# Patient Record
Sex: FEMALE | Race: WHITE
[De-identification: names, ages, dates, MRNs, and addresses within clinical notes are randomized per-mention and may not be internally consistent; named-entity substitution may affect disease eponyms.]

---

## 2017-01-16 ENCOUNTER — RX ONLY (RX ONLY)
Age: 52
End: 2017-01-16

## 2017-01-16 ENCOUNTER — DOCTOR'S OFFICE (OUTPATIENT)
Dept: URBAN - NONMETROPOLITAN AREA CLINIC 1 | Facility: CLINIC | Age: 52
Setting detail: OPHTHALMOLOGY
End: 2017-01-16
Payer: COMMERCIAL

## 2017-01-16 DIAGNOSIS — H04.122: ICD-10-CM

## 2017-01-16 DIAGNOSIS — H04.121: ICD-10-CM

## 2017-01-16 DIAGNOSIS — H40.032: ICD-10-CM

## 2017-01-16 DIAGNOSIS — H40.031: ICD-10-CM

## 2017-01-16 DIAGNOSIS — H01.005: ICD-10-CM

## 2017-01-16 DIAGNOSIS — L25.9: ICD-10-CM

## 2017-01-16 DIAGNOSIS — H01.001: ICD-10-CM

## 2017-01-16 DIAGNOSIS — H01.002: ICD-10-CM

## 2017-01-16 DIAGNOSIS — H01.004: ICD-10-CM

## 2017-01-16 PROCEDURE — 92012 INTRM OPH EXAM EST PATIENT: CPT | Performed by: OPHTHALMOLOGY

## 2017-01-16 PROCEDURE — 92020 GONIOSCOPY: CPT | Performed by: OPHTHALMOLOGY

## 2017-01-16 ASSESSMENT — VISUAL ACUITY
OD_BCVA: 20/100-1
OS_BCVA: 20/30

## 2017-01-16 ASSESSMENT — REFRACTION_CURRENTRX
OS_OVR_VA: 20/
OD_OVR_VA: 20/
OD_OVR_VA: 20/
OS_OVR_VA: 20/
OD_OVR_VA: 20/
OS_OVR_VA: 20/

## 2017-01-16 ASSESSMENT — LID EXAM ASSESSMENTS
OD_MEIBOMITIS: 1+
OS_MEIBOMITIS: 1+
OD_BLEPHARITIS: T
OD_COMMENTS: CONTACT DERMATITIS
OS_BLEPHARITIS: T
OS_COMMENTS: CONTACT DERMATITIS

## 2017-01-16 ASSESSMENT — REFRACTION_MANIFEST
OD_VA1: 20/
OS_VA2: 20/
OS_VA3: 20/
OU_VA: 20/
OS_VA2: 20/
OD_VA3: 20/
OS_VA3: 20/
OS_VA1: 20/
OD_VA3: 20/
OD_VA2: 20/
OD_VA1: 20/
OD_VA3: 20/
OS_VA1: 20/
OU_VA: 20/
OS_VA2: 20/
OS_VA1: 20/
OD_VA2: 20/
OS_VA3: 20/
OU_VA: 20/
OD_VA2: 20/
OD_VA1: 20/

## 2017-01-16 ASSESSMENT — CONFRONTATIONAL VISUAL FIELD TEST (CVF)
OD_FINDINGS: FULL
OS_FINDINGS: FULL

## 2017-10-05 ENCOUNTER — DOCTOR'S OFFICE (OUTPATIENT)
Dept: URBAN - NONMETROPOLITAN AREA CLINIC 1 | Facility: CLINIC | Age: 52
Setting detail: OPHTHALMOLOGY
End: 2017-10-05
Payer: MEDICARE

## 2017-10-05 DIAGNOSIS — H40.033: ICD-10-CM

## 2017-10-05 DIAGNOSIS — H01.005: ICD-10-CM

## 2017-10-05 DIAGNOSIS — H10.503: ICD-10-CM

## 2017-10-05 DIAGNOSIS — H01.004: ICD-10-CM

## 2017-10-05 DIAGNOSIS — H01.001: ICD-10-CM

## 2017-10-05 DIAGNOSIS — H01.002: ICD-10-CM

## 2017-10-05 PROCEDURE — 92012 INTRM OPH EXAM EST PATIENT: CPT | Performed by: OPHTHALMOLOGY

## 2017-10-05 ASSESSMENT — REFRACTION_MANIFEST
OD_VA3: 20/
OD_VA2: 20/
OS_VA1: 20/
OU_VA: 20/
OU_VA: 20/
OS_VA1: 20/
OS_VA2: 20/
OD_VA1: 20/
OD_VA1: 20/
OS_VA2: 20/
OD_VA1: 20/
OS_VA1: 20/
OS_VA3: 20/
OD_VA3: 20/
OD_VA2: 20/
OD_VA3: 20/
OD_VA2: 20/
OS_VA3: 20/
OS_VA3: 20/
OS_VA2: 20/
OU_VA: 20/

## 2017-10-05 ASSESSMENT — REFRACTION_CURRENTRX
OS_OVR_VA: 20/
OS_OVR_VA: 20/
OD_OVR_VA: 20/
OS_OVR_VA: 20/

## 2017-10-05 ASSESSMENT — CONFRONTATIONAL VISUAL FIELD TEST (CVF)
OS_FINDINGS: FULL
OD_FINDINGS: FULL

## 2017-10-05 ASSESSMENT — LID EXAM ASSESSMENTS
OS_MEIBOMITIS: 1+
OD_COMMENTS: CONTACT DERMATITIS
OD_BLEPHARITIS: T
OS_COMMENTS: CONTACT DERMATITIS
OS_BLEPHARITIS: T
OD_MEIBOMITIS: 1+

## 2017-10-05 ASSESSMENT — VISUAL ACUITY
OD_BCVA: 20/25
OS_BCVA: 20/30-2

## 2017-10-06 ENCOUNTER — DOCTOR'S OFFICE (OUTPATIENT)
Dept: URBAN - NONMETROPOLITAN AREA CLINIC 1 | Facility: CLINIC | Age: 52
Setting detail: OPHTHALMOLOGY
End: 2017-10-06
Payer: MEDICARE

## 2017-10-06 ENCOUNTER — RX ONLY (RX ONLY)
Age: 52
End: 2017-10-06

## 2017-10-06 DIAGNOSIS — H01.004: ICD-10-CM

## 2017-10-06 DIAGNOSIS — H01.002: ICD-10-CM

## 2017-10-06 DIAGNOSIS — H01.005: ICD-10-CM

## 2017-10-06 DIAGNOSIS — H01.001: ICD-10-CM

## 2017-10-06 DIAGNOSIS — H40.033: ICD-10-CM

## 2017-10-06 DIAGNOSIS — H10.503: ICD-10-CM

## 2017-10-06 PROCEDURE — 92012 INTRM OPH EXAM EST PATIENT: CPT | Performed by: OPHTHALMOLOGY

## 2017-10-06 ASSESSMENT — REFRACTION_MANIFEST
OD_VA3: 20/
OS_VA1: 20/
OU_VA: 20/
OD_VA1: 20/
OD_VA1: 20/
OS_VA3: 20/
OS_VA2: 20/
OS_VA3: 20/
OU_VA: 20/
OD_VA2: 20/
OS_VA1: 20/
OD_VA2: 20/
OS_VA2: 20/
OS_VA3: 20/
OD_VA2: 20/
OD_VA3: 20/
OS_VA2: 20/
OD_VA1: 20/
OS_VA1: 20/
OD_VA3: 20/
OU_VA: 20/

## 2017-10-06 ASSESSMENT — LID EXAM ASSESSMENTS
OS_MEIBOMITIS: 1+
OD_COMMENTS: CONTACT DERMATITIS
OD_BLEPHARITIS: T
OS_BLEPHARITIS: T
OD_MEIBOMITIS: 1+
OS_COMMENTS: CONTACT DERMATITIS

## 2017-10-06 ASSESSMENT — REFRACTION_CURRENTRX
OD_OVR_VA: 20/
OS_OVR_VA: 20/
OS_OVR_VA: 20/
OD_OVR_VA: 20/
OS_OVR_VA: 20/
OD_OVR_VA: 20/

## 2017-10-06 ASSESSMENT — VISUAL ACUITY
OS_BCVA: 20/60-1
OD_BCVA: 20/80

## 2017-10-12 ENCOUNTER — DOCTOR'S OFFICE (OUTPATIENT)
Dept: URBAN - NONMETROPOLITAN AREA CLINIC 1 | Facility: CLINIC | Age: 52
Setting detail: OPHTHALMOLOGY
End: 2017-10-12
Payer: MEDICARE

## 2017-10-12 DIAGNOSIS — H01.005: ICD-10-CM

## 2017-10-12 DIAGNOSIS — H01.001: ICD-10-CM

## 2017-10-12 DIAGNOSIS — H01.002: ICD-10-CM

## 2017-10-12 DIAGNOSIS — H01.004: ICD-10-CM

## 2017-10-12 DIAGNOSIS — H10.503: ICD-10-CM

## 2017-10-12 DIAGNOSIS — H04.123: ICD-10-CM

## 2017-10-12 PROCEDURE — 92012 INTRM OPH EXAM EST PATIENT: CPT | Performed by: OPHTHALMOLOGY

## 2017-10-12 ASSESSMENT — REFRACTION_MANIFEST
OU_VA: 20/
OD_VA2: 20/
OS_VA3: 20/
OU_VA: 20/
OD_VA3: 20/
OD_VA1: 20/
OD_VA2: 20/
OD_VA1: 20/
OS_VA3: 20/
OD_VA3: 20/
OS_VA1: 20/
OS_VA1: 20/
OS_VA2: 20/
OS_VA1: 20/
OS_VA2: 20/
OS_VA2: 20/
OS_VA3: 20/
OU_VA: 20/
OD_VA3: 20/
OD_VA1: 20/
OD_VA2: 20/

## 2017-10-12 ASSESSMENT — REFRACTION_CURRENTRX
OD_OVR_VA: 20/
OD_OVR_VA: 20/
OS_OVR_VA: 20/
OD_OVR_VA: 20/

## 2017-10-12 ASSESSMENT — LID EXAM ASSESSMENTS
OS_MEIBOMITIS: 1+
OS_COMMENTS: CONTACT DERMATITIS
OS_BLEPHARITIS: T
OD_MEIBOMITIS: 1+
OD_ANGULAR_BLEPHARITIS: 1+
OS_ANGULAR_BLEPHARITIS: 1+
OD_COMMENTS: CONTACT DERMATITIS
OD_BLEPHARITIS: T

## 2017-10-12 ASSESSMENT — SUPERFICIAL PUNCTATE KERATITIS (SPK)
OD_SPK: 2+
OS_SPK: 2+

## 2017-10-12 ASSESSMENT — VISUAL ACUITY
OD_BCVA: 20/80
OS_BCVA: 20/60-1

## 2017-10-26 ENCOUNTER — DOCTOR'S OFFICE (OUTPATIENT)
Dept: URBAN - NONMETROPOLITAN AREA CLINIC 1 | Facility: CLINIC | Age: 52
Setting detail: OPHTHALMOLOGY
End: 2017-10-26
Payer: MEDICARE

## 2017-10-26 DIAGNOSIS — H10.12: ICD-10-CM

## 2017-10-26 DIAGNOSIS — H10.503: ICD-10-CM

## 2017-10-26 DIAGNOSIS — H10.13: ICD-10-CM

## 2017-10-26 DIAGNOSIS — H01.002: ICD-10-CM

## 2017-10-26 DIAGNOSIS — H10.11: ICD-10-CM

## 2017-10-26 DIAGNOSIS — H01.001: ICD-10-CM

## 2017-10-26 DIAGNOSIS — H01.004: ICD-10-CM

## 2017-10-26 DIAGNOSIS — H04.123: ICD-10-CM

## 2017-10-26 DIAGNOSIS — H04.121: ICD-10-CM

## 2017-10-26 DIAGNOSIS — H04.122: ICD-10-CM

## 2017-10-26 DIAGNOSIS — H01.005: ICD-10-CM

## 2017-10-26 PROCEDURE — 83861 MICROFLUID ANALY TEARS: CPT | Performed by: OPHTHALMOLOGY

## 2017-10-26 PROCEDURE — 92014 COMPRE OPH EXAM EST PT 1/>: CPT | Performed by: OPHTHALMOLOGY

## 2017-10-26 ASSESSMENT — REFRACTION_MANIFEST
OU_VA: 20/
OS_VA2: 20/
OD_VA1: 20/
OS_VA3: 20/
OD_VA2: 20/
OS_VA1: 20/
OD_VA1: 20/
OS_VA3: 20/
OD_VA3: 20/
OS_VA2: 20/
OD_VA3: 20/
OS_VA3: 20/
OU_VA: 20/
OS_VA1: 20/
OD_VA3: 20/
OU_VA: 20/
OD_VA2: 20/
OS_VA1: 20/
OD_VA2: 20/
OS_VA2: 20/
OD_VA1: 20/

## 2017-10-26 ASSESSMENT — LID EXAM ASSESSMENTS
OD_ANGULAR_BLEPHARITIS: T
OS_ANGULAR_BLEPHARITIS: T
OD_BLEPHARITIS: T
OS_MEIBOMITIS: T
OS_BLEPHARITIS: T
OD_COMMENTS: CONTACT DERMATITIS
OS_COMMENTS: CONTACT DERMATITIS
OD_MEIBOMITIS: T

## 2017-10-26 ASSESSMENT — VISUAL ACUITY
OD_BCVA: 20/80
OS_BCVA: 20/60-1

## 2017-10-26 ASSESSMENT — REFRACTION_CURRENTRX
OS_OVR_VA: 20/
OD_OVR_VA: 20/

## 2017-10-26 ASSESSMENT — SUPERFICIAL PUNCTATE KERATITIS (SPK)
OS_SPK: 2+
OD_SPK: 2+

## 2017-10-26 ASSESSMENT — CONFRONTATIONAL VISUAL FIELD TEST (CVF)
OS_FINDINGS: FULL
OD_FINDINGS: FULL

## 2017-12-01 ENCOUNTER — RX ONLY (RX ONLY)
Age: 52
End: 2017-12-01

## 2017-12-01 ENCOUNTER — DOCTOR'S OFFICE (OUTPATIENT)
Dept: URBAN - NONMETROPOLITAN AREA CLINIC 1 | Facility: CLINIC | Age: 52
Setting detail: OPHTHALMOLOGY
End: 2017-12-01
Payer: MEDICARE

## 2017-12-01 DIAGNOSIS — H10.13: ICD-10-CM

## 2017-12-01 DIAGNOSIS — H10.503: ICD-10-CM

## 2017-12-01 DIAGNOSIS — H01.001: ICD-10-CM

## 2017-12-01 DIAGNOSIS — H04.123: ICD-10-CM

## 2017-12-01 DIAGNOSIS — H01.004: ICD-10-CM

## 2017-12-01 DIAGNOSIS — H01.005: ICD-10-CM

## 2017-12-01 DIAGNOSIS — H01.002: ICD-10-CM

## 2017-12-01 PROCEDURE — 92014 COMPRE OPH EXAM EST PT 1/>: CPT | Performed by: OPHTHALMOLOGY

## 2017-12-01 ASSESSMENT — REFRACTION_CURRENTRX
OD_OVR_VA: 20/
OS_OVR_VA: 20/

## 2017-12-01 ASSESSMENT — REFRACTION_MANIFEST
OD_VA1: 20/
OS_VA1: 20/
OS_VA3: 20/
OD_VA1: 20/
OD_VA2: 20/
OS_VA3: 20/
OS_VA2: 20/
OD_VA3: 20/
OD_VA2: 20/
OS_VA2: 20/
OS_VA2: 20/
OU_VA: 20/
OD_VA3: 20/
OU_VA: 20/
OS_VA1: 20/
OD_VA2: 20/
OU_VA: 20/
OD_VA1: 20/
OS_VA1: 20/
OD_VA3: 20/
OS_VA3: 20/

## 2017-12-01 ASSESSMENT — CONFRONTATIONAL VISUAL FIELD TEST (CVF)
OS_FINDINGS: FULL
OD_FINDINGS: FULL

## 2017-12-01 ASSESSMENT — LID EXAM ASSESSMENTS
OS_BLEPHARITIS: T
OD_MEIBOMITIS: T
OS_COMMENTS: CONTACT DERMATITIS
OS_ANGULAR_BLEPHARITIS: T
OS_MEIBOMITIS: T
OD_ANGULAR_BLEPHARITIS: T
OD_BLEPHARITIS: T
OD_COMMENTS: CONTACT DERMATITIS

## 2017-12-01 ASSESSMENT — SUPERFICIAL PUNCTATE KERATITIS (SPK)
OD_SPK: 2+
OS_SPK: 2+

## 2017-12-01 ASSESSMENT — VISUAL ACUITY
OD_BCVA: 20/80
OS_BCVA: 20/50-2

## 2019-02-11 ENCOUNTER — DOCTOR'S OFFICE (OUTPATIENT)
Dept: URBAN - NONMETROPOLITAN AREA CLINIC 1 | Facility: CLINIC | Age: 54
Setting detail: OPHTHALMOLOGY
End: 2019-02-11
Payer: COMMERCIAL

## 2019-02-11 ENCOUNTER — RX ONLY (RX ONLY)
Age: 54
End: 2019-02-11

## 2019-02-11 DIAGNOSIS — H40.033: ICD-10-CM

## 2019-02-11 DIAGNOSIS — H10.503: ICD-10-CM

## 2019-02-11 DIAGNOSIS — H53.123: ICD-10-CM

## 2019-02-11 DIAGNOSIS — H04.123: ICD-10-CM

## 2019-02-11 PROCEDURE — 92020 GONIOSCOPY: CPT | Performed by: OPHTHALMOLOGY

## 2019-02-11 PROCEDURE — 92132 CPTRZD OPH DX IMG ANT SGM: CPT | Performed by: OPHTHALMOLOGY

## 2019-02-11 PROCEDURE — 92012 INTRM OPH EXAM EST PATIENT: CPT | Performed by: OPHTHALMOLOGY

## 2019-02-11 ASSESSMENT — REFRACTION_MANIFEST
OS_VA2: 20/
OS_VA3: 20/
OD_VA1: 20/
OD_VA2: 20/
OD_VA3: 20/
OS_VA1: 20/
OS_VA3: 20/
OD_VA1: 20/
OS_VA1: 20/
OU_VA: 20/
OS_VA2: 20/
OD_VA3: 20/
OD_VA2: 20/
OU_VA: 20/

## 2019-02-11 ASSESSMENT — REFRACTION_CURRENTRX
OD_OVR_VA: 20/
OS_OVR_VA: 20/
OD_OVR_VA: 20/
OD_OVR_VA: 20/

## 2019-02-11 ASSESSMENT — CONFRONTATIONAL VISUAL FIELD TEST (CVF)
OS_FINDINGS: FULL
OD_FINDINGS: FULL

## 2019-02-11 ASSESSMENT — VISUAL ACUITY
OD_BCVA: 20/60+1
OS_BCVA: 20/40

## 2019-02-11 ASSESSMENT — LID EXAM ASSESSMENTS
OS_MEIBOMITIS: T
OS_COMMENTS: CONTACT DERMATITIS
OD_MEIBOMITIS: T
OS_BLEPHARITIS: T
OD_BLEPHARITIS: T
OD_COMMENTS: CONTACT DERMATITIS
OS_ANGULAR_BLEPHARITIS: T
OD_ANGULAR_BLEPHARITIS: T

## 2019-02-11 ASSESSMENT — SUPERFICIAL PUNCTATE KERATITIS (SPK)
OD_SPK: 2+
OS_SPK: 2+

## 2019-03-15 ENCOUNTER — DOCTOR'S OFFICE (OUTPATIENT)
Dept: URBAN - NONMETROPOLITAN AREA CLINIC 1 | Facility: CLINIC | Age: 54
Setting detail: OPHTHALMOLOGY
End: 2019-03-15
Payer: COMMERCIAL

## 2019-03-15 DIAGNOSIS — H53.123: ICD-10-CM

## 2019-03-15 DIAGNOSIS — H04.123: ICD-10-CM

## 2019-03-15 DIAGNOSIS — H53.122: ICD-10-CM

## 2019-03-15 DIAGNOSIS — H10.11: ICD-10-CM

## 2019-03-15 DIAGNOSIS — H04.122: ICD-10-CM

## 2019-03-15 DIAGNOSIS — H40.033: ICD-10-CM

## 2019-03-15 DIAGNOSIS — H53.121: ICD-10-CM

## 2019-03-15 PROCEDURE — 83861 MICROFLUID ANALY TEARS: CPT | Performed by: OPHTHALMOLOGY

## 2019-03-15 PROCEDURE — 92235 FLUORESCEIN ANGRPH MLTIFRAME: CPT | Performed by: OPHTHALMOLOGY

## 2019-03-15 PROCEDURE — 92225 OPHTHALMOSCOPY EXTENDED INITIAL: CPT | Performed by: OPHTHALMOLOGY

## 2019-03-15 PROCEDURE — 92250 FUNDUS PHOTOGRAPHY W/I&R: CPT | Performed by: OPHTHALMOLOGY

## 2019-03-15 PROCEDURE — 92014 COMPRE OPH EXAM EST PT 1/>: CPT | Performed by: OPHTHALMOLOGY

## 2019-03-15 ASSESSMENT — REFRACTION_MANIFEST
OS_VA2: 20/
OU_VA: 20/
OD_VA2: 20/
OS_VA1: 20/
OD_VA3: 20/
OS_VA3: 20/
OS_VA2: 20/
OD_VA2: 20/
OD_VA3: 20/
OD_VA1: 20/
OU_VA: 20/
OD_VA1: 20/
OS_VA1: 20/
OS_VA3: 20/

## 2019-03-15 ASSESSMENT — LID EXAM ASSESSMENTS
OS_BLEPHARITIS: T
OS_MEIBOMITIS: T
OD_COMMENTS: CONTACT DERMATITIS
OD_MEIBOMITIS: T
OS_ANGULAR_BLEPHARITIS: T
OD_BLEPHARITIS: T
OS_COMMENTS: CONTACT DERMATITIS
OD_ANGULAR_BLEPHARITIS: T

## 2019-03-15 ASSESSMENT — REFRACTION_CURRENTRX
OS_OVR_VA: 20/
OD_OVR_VA: 20/
OD_OVR_VA: 20/
OS_OVR_VA: 20/
OS_OVR_VA: 20/
OD_OVR_VA: 20/

## 2019-03-15 ASSESSMENT — VISUAL ACUITY
OD_BCVA: 20/70+2
OS_BCVA: 20/30-1

## 2019-03-15 ASSESSMENT — CONFRONTATIONAL VISUAL FIELD TEST (CVF)
OS_FINDINGS: FULL
OD_FINDINGS: FULL

## 2019-03-15 ASSESSMENT — SUPERFICIAL PUNCTATE KERATITIS (SPK)
OS_SPK: 2+
OD_SPK: 2+

## 2019-03-15 ASSESSMENT — DECREASING TEAR LAKE - SEVERITY SCORE
OD_DEC_TEARLAKE: 1+
OS_DEC_TEARLAKE: 1+

## 2019-04-01 ENCOUNTER — DOCTOR'S OFFICE (OUTPATIENT)
Dept: URBAN - NONMETROPOLITAN AREA CLINIC 1 | Facility: CLINIC | Age: 54
Setting detail: OPHTHALMOLOGY
End: 2019-04-01
Payer: COMMERCIAL

## 2019-04-01 DIAGNOSIS — H52.223: ICD-10-CM

## 2019-04-01 DIAGNOSIS — H52.4: ICD-10-CM

## 2019-04-01 PROBLEM — H25.13 CATARACT NUCLEAR SCLEROSIS AGE RELATED; BOTH EYES: Status: ACTIVE | Noted: 2017-01-16

## 2019-04-01 PROBLEM — H40.032: Status: ACTIVE | Noted: 2017-01-16

## 2019-04-01 PROBLEM — H01.004 BLEPHARITIS; RIGHT UPPER LID, RIGHT LOWER LID, LEFT UPPER LID, LEFT LOWER LID: Status: ACTIVE | Noted: 2017-10-26

## 2019-04-01 PROBLEM — H10.503: Status: ACTIVE | Noted: 2017-10-05

## 2019-04-01 PROBLEM — H53.122: Status: ACTIVE | Noted: 2019-02-11

## 2019-04-01 PROBLEM — H01.001 BLEPHARITIS; RIGHT UPPER LID, RIGHT LOWER LID, LEFT UPPER LID, LEFT LOWER LID: Status: ACTIVE | Noted: 2017-10-26

## 2019-04-01 PROBLEM — H01.002 BLEPHARITIS; RIGHT UPPER LID, RIGHT LOWER LID, LEFT UPPER LID, LEFT LOWER LID: Status: ACTIVE | Noted: 2017-10-26

## 2019-04-01 PROBLEM — H40.031: Status: ACTIVE | Noted: 2017-01-16

## 2019-04-01 PROBLEM — H01.005 BLEPHARITIS; RIGHT UPPER LID, RIGHT LOWER LID, LEFT UPPER LID, LEFT LOWER LID: Status: ACTIVE | Noted: 2017-10-26

## 2019-04-01 PROBLEM — H10.12 ALLERGIC CONJUNCTIVITIS; RIGHT EYE, LEFT EYE: Status: ACTIVE | Noted: 2017-10-26

## 2019-04-01 PROBLEM — H04.121 DRY EYE; RIGHT EYE, LEFT EYE: Status: ACTIVE | Noted: 2017-01-16

## 2019-04-01 PROBLEM — L25.9 CONTACT DERMATITIS, UNSPECIFIED CAUSE ; BOTH EYES: Status: ACTIVE | Noted: 2017-01-16

## 2019-04-01 PROBLEM — H10.11 ALLERGIC CONJUNCTIVITIS; RIGHT EYE, LEFT EYE: Status: ACTIVE | Noted: 2017-10-26

## 2019-04-01 PROBLEM — H04.122 DRY EYE; RIGHT EYE, LEFT EYE: Status: ACTIVE | Noted: 2017-01-16

## 2019-04-01 PROBLEM — H53.121: Status: ACTIVE | Noted: 2019-02-11

## 2019-04-01 PROCEDURE — 92015 DETERMINE REFRACTIVE STATE: CPT | Performed by: OPTOMETRIST

## 2019-04-01 ASSESSMENT — REFRACTION_MANIFEST
OD_AXIS: 090
OD_SPHERE: PLANO
OD_VA3: 20/
OU_VA: 20/
OS_VA3: 20/
OD_VA3: 20/
OU_VA: 20/
OD_VA2: 20/
OS_VA1: 20/
OS_VA2: 20/20-2
OD_VA1: 20/
OS_AXIS: 085
OD_ADD: +2.50
OD_CYLINDER: -1.00
OD_VA1: 20/20-1
OS_SPHERE: -1.25
OS_VA1: 20/20-2
OS_ADD: +2.50
OS_VA2: 20/
OS_VA3: 20/
OD_VA2: 20/20-1
OS_CYLINDER: -1.25

## 2019-04-01 ASSESSMENT — REFRACTION_AUTOREFRACTION
OD_CYLINDER: -0.75
OS_AXIS: 086
OD_AXIS: 089
OD_SPHERE: -0.25
OS_SPHERE: -1.25
OS_CYLINDER: -1.25

## 2019-04-01 ASSESSMENT — REFRACTION_CURRENTRX
OS_OVR_VA: 20/
OD_OVR_VA: 20/

## 2019-04-01 ASSESSMENT — SPHEQUIV_DERIVED
OS_SPHEQUIV: -1.875
OS_SPHEQUIV: -1.875
OD_SPHEQUIV: -0.625

## 2019-04-01 ASSESSMENT — VISUAL ACUITY
OD_BCVA: 20/70+2
OS_BCVA: 20/30-1

## 2019-04-23 ENCOUNTER — OPTICAL OFFICE (OUTPATIENT)
Dept: URBAN - NONMETROPOLITAN AREA CLINIC 4 | Facility: CLINIC | Age: 54
Setting detail: OPHTHALMOLOGY
End: 2019-04-23
Payer: COMMERCIAL

## 2019-04-23 DIAGNOSIS — H52.223: ICD-10-CM

## 2019-04-23 PROCEDURE — V2781 PROGRESSIVE LENS PER LENS: HCPCS | Performed by: OPTOMETRIST

## 2019-04-23 PROCEDURE — V2025 EYEGLASSES DELUX FRAMES: HCPCS | Performed by: OPTOMETRIST

## 2019-04-23 PROCEDURE — V2020 VISION SVCS FRAMES PURCHASES: HCPCS | Performed by: OPTOMETRIST

## 2019-04-23 PROCEDURE — V2202 LENS SPHERE BIFOCAL 7.12-20.: HCPCS | Performed by: OPTOMETRIST

## 2019-04-23 PROCEDURE — V2750 ANTI-REFLECTIVE COATING: HCPCS | Performed by: OPTOMETRIST

## 2020-02-06 ENCOUNTER — APPOINTMENT (EMERGENCY)
Dept: RADIOLOGY | Facility: HOSPITAL | Age: 55
End: 2020-02-06
Payer: COMMERCIAL

## 2020-02-06 ENCOUNTER — HOSPITAL ENCOUNTER (EMERGENCY)
Facility: HOSPITAL | Age: 55
Discharge: HOME | End: 2020-02-06
Attending: EMERGENCY MEDICINE | Admitting: EMERGENCY MEDICINE
Payer: COMMERCIAL

## 2020-02-06 VITALS
HEIGHT: 68 IN | TEMPERATURE: 97.3 F | SYSTOLIC BLOOD PRESSURE: 119 MMHG | BODY MASS INDEX: 25.01 KG/M2 | DIASTOLIC BLOOD PRESSURE: 75 MMHG | OXYGEN SATURATION: 97 % | RESPIRATION RATE: 17 BRPM | HEART RATE: 78 BPM | WEIGHT: 165 LBS

## 2020-02-06 DIAGNOSIS — S46.002A ROTATOR CUFF INJURY, LEFT, INITIAL ENCOUNTER: Primary | ICD-10-CM

## 2020-02-06 PROCEDURE — 99283 EMERGENCY DEPT VISIT LOW MDM: CPT

## 2020-02-06 PROCEDURE — 73030 X-RAY EXAM OF SHOULDER: CPT

## 2020-02-06 PROCEDURE — 99284 EMERGENCY DEPT VISIT MOD MDM: CPT | Performed by: EMERGENCY MEDICINE

## 2020-02-06 RX ORDER — OXYCODONE HYDROCHLORIDE AND ACETAMINOPHEN 5; 325 MG/1; MG/1
1 TABLET ORAL EVERY 4 HOURS PRN
Qty: 10 TABLET | Refills: 0 | Status: SHIPPED | OUTPATIENT
Start: 2020-02-06 | End: 2021-07-26

## 2020-02-06 RX ORDER — NAPROXEN 500 MG/1
500 TABLET ORAL 2 TIMES DAILY WITH MEALS
Qty: 30 TABLET | Refills: 0 | Status: SHIPPED | OUTPATIENT
Start: 2020-02-06 | End: 2021-08-06 | Stop reason: SDUPTHER

## 2020-02-06 RX ORDER — CITALOPRAM 20 MG/1
20 TABLET ORAL DAILY
COMMUNITY
Start: 2019-09-09

## 2020-02-06 NOTE — ED PROVIDER NOTES
History  Chief Complaint   Patient presents with    Shoulder Injury     Pt reports approx 1 week ago she fell outside of a [employer] on a curb onto her L shoulder     Approximately 12 days ago the patient tripped over curb and hurt her left shoulder  Has been having increasing pain since then  No history of previous injury  Try to push off the shoulder last night and the pain increased  Worse with movement  No chest pain shortness breath  No abdominal pain  No neck pain  No loss conscious  History provided by:  Patient   used: No    Shoulder Injury   Location:  Shoulder  Shoulder location:  L shoulder  Pain details:     Quality:  Aching    Radiates to:  Does not radiate    Severity:  Moderate    Onset quality:  Gradual    Duration:  12 days    Timing:  Constant    Progression:  Worsening  Foreign body present:  No foreign bodies  Prior injury to area:  No  Relieved by:  Nothing  Worsened by: Movement  Ineffective treatments:  None tried  Associated symptoms: no back pain, no fatigue, no fever, no neck pain, no swelling and no tingling        Prior to Admission Medications   Prescriptions Last Dose Informant Patient Reported? Taking?   citalopram (CeleXA) 20 mg tablet   Yes Yes   Sig: Take 20 mg by mouth daily      Facility-Administered Medications: None       History reviewed  No pertinent past medical history  History reviewed  No pertinent surgical history  History reviewed  No pertinent family history  I have reviewed and agree with the history as documented  Social History     Tobacco Use    Smoking status: Current Every Day Smoker     Packs/day: 0 50    Smokeless tobacco: Never Used   Substance Use Topics    Alcohol use: Not Currently    Drug use: Not Currently        Review of Systems   Constitutional: Negative for chills, fatigue and fever  HENT: Negative for ear pain, hearing loss, sore throat, trouble swallowing and voice change      Eyes: Negative for pain and discharge  Respiratory: Negative for cough, shortness of breath and wheezing  Cardiovascular: Negative for chest pain and palpitations  Gastrointestinal: Negative for abdominal pain, blood in stool, constipation, diarrhea, nausea and vomiting  Genitourinary: Negative for dysuria, flank pain, frequency and hematuria  Musculoskeletal: Positive for arthralgias  Negative for back pain, joint swelling, neck pain and neck stiffness  Skin: Negative for rash and wound  Neurological: Negative for dizziness, seizures, syncope, facial asymmetry and headaches  Psychiatric/Behavioral: Negative for hallucinations, self-injury and suicidal ideas  All other systems reviewed and are negative  Physical Exam  Physical Exam   Constitutional: She is oriented to person, place, and time  She appears well-developed and well-nourished  HENT:   Head: Normocephalic and atraumatic  Eyes: Pupils are equal, round, and reactive to light  EOM are normal    Neck: Normal range of motion  Neck supple  Cardiovascular: Normal rate and regular rhythm  Pulmonary/Chest: Effort normal and breath sounds normal  No respiratory distress  Abdominal: Soft  Bowel sounds are normal  There is no tenderness  Musculoskeletal:   Decreased range of motion at the shoulder  AC joint with minimal tenderness  Radial pulses is 2+  Cap refill less than 2 seconds  Neurological: She is alert and oriented to person, place, and time  Skin: Skin is warm and dry  Capillary refill takes less than 2 seconds  Psychiatric: She has a normal mood and affect         Vital Signs  ED Triage Vitals [02/06/20 1124]   Temperature Pulse Respirations Blood Pressure SpO2   (!) 97 3 °F (36 3 °C) 77 18 120/85 98 %      Temp Source Heart Rate Source Patient Position - Orthostatic VS BP Location FiO2 (%)   Temporal Monitor Lying Right arm --      Pain Score       7           Vitals:    02/06/20 1124   BP: 120/85   Pulse: 77   Patient Position - Orthostatic VS: Lying         Visual Acuity      ED Medications  Medications - No data to display    Diagnostic Studies  Results Reviewed     None                 XR shoulder 2+ views LEFT    (Results Pending)              Procedures  Procedures         ED Course  ED Course as of Feb 06 1151   Thu Feb 06, 2020   1146 Discussed with patient her x-ray results  Will place in a sling  Patient is made aware that she cannot keep the arm in the sling for prolonged period period need to do range-of-motion exercises with the shoulder  MDM      Disposition  Final diagnoses:   Rotator cuff injury, left, initial encounter     Time reflects when diagnosis was documented in both MDM as applicable and the Disposition within this note     Time User Action Codes Description Comment    2/6/2020 11:47 AM Burt Galdamez Add [S46 002A] Rotator cuff injury, left, initial encounter       ED Disposition     ED Disposition Condition Date/Time Comment    Discharge Stable u Feb 6, 2020 11:46 AM Lilian Barkley discharge to home/self care              Follow-up Information     Follow up With Specialties Details Why Lorayne Sandhoff, MD Internal Medicine In 2 days  [address] [facility] [address] [phone]            Patient's Medications   Discharge Prescriptions    NAPROXEN (NAPROSYN) 500 MG TABLET    Take 1 tablet (500 mg total) by mouth 2 (two) times a day with meals       Start Date: 2/6/2020  End Date: --       Order Dose: 500 mg       Quantity: 30 tablet    Refills: 0    OXYCODONE-ACETAMINOPHEN (PERCOCET) 5-325 MG PER TABLET    Take 1 tablet by mouth every 4 (four) hours as needed for moderate pain for up to 10 dosesMax Daily Amount: 6 tablets       Start Date: 2/6/2020  End Date: --       Order Dose: 1 tablet       Quantity: 10 tablet    Refills: 0         ED Provider  Electronically Signed by           Bryson Youngblood MD  02/06/20 1131 No  Muir Schmidt [place] Abhilash Chi, MD  02/06/20 115

## 2020-02-06 NOTE — DISCHARGE INSTRUCTIONS
A few times each day please take your shoulder out of the sling and do range-of-motion exercises  Do not keep her shoulder in the sling for prolonged periods of time

## 2020-02-10 ENCOUNTER — OFFICE VISIT (OUTPATIENT)
Dept: OBGYN CLINIC | Facility: CLINIC | Age: 55
End: 2020-02-10
Payer: COMMERCIAL

## 2020-02-10 VITALS
DIASTOLIC BLOOD PRESSURE: 80 MMHG | RESPIRATION RATE: 16 BRPM | HEIGHT: 68 IN | WEIGHT: 168.87 LBS | HEART RATE: 68 BPM | SYSTOLIC BLOOD PRESSURE: 116 MMHG | BODY MASS INDEX: 25.59 KG/M2

## 2020-02-10 DIAGNOSIS — M25.512 ACUTE PAIN OF LEFT SHOULDER: Primary | ICD-10-CM

## 2020-02-10 DIAGNOSIS — M75.22 BICIPITAL TENDINITIS OF LEFT SHOULDER: ICD-10-CM

## 2020-02-10 PROCEDURE — 99204 OFFICE O/P NEW MOD 45 MIN: CPT | Performed by: ORTHOPAEDIC SURGERY

## 2020-02-10 NOTE — PROGRESS NOTES
ASSESSMENT/PLAN:    Diagnoses and all orders for this visit:    Acute pain of left shoulder    Bicipital tendinitis of left shoulder  -     Ambulatory referral to Orthopedic Surgery        Plan:  I offered the potential benefits of injection but she does not want to consider injection at this time  A prescription for physical therapy was placed in her chart and she was to initiate physical therapy as soon as possible  She is to begin working on gentle range-of-motion exercises as discussed in the office today and use the sling only as needed  She will continue the Naprosyn  She was instructed to contact me if she needs a refill  I will see her in 3 or 4 weeks for re-evaluation and discuss further treatment options pending her progress  Return for 3 - 4 weeks  _____________________________________________________  CHIEF COMPLAINT:  Chief Complaint   Patient presents with    Left Shoulder - Pain         SUBJECTIVE:  Emeli Dior is a 63y o  year old right-hand-dominant female who presents for evaluation of her left shoulder injured on 01/24/2020  She states she fell outside of store but cannot recall the exact mechanism of injury  She noted a mild degree of improvement over the ensuing 2 and half weeks but developed an acute worsening of her pain on 02/06/2020  She states she pushed herself up out of bed, felt a pop and had significant worsening of pain  Although pain has improved to some degree, it has not returned to the level of pain she had been experiencing on 02/05/2020  She was seen in the emergency room at [address] [place] [address] on 02/06/2020, x-rays were obtained and she presents for orthopedic evaluation and treatment  She complains of pain over the anterior shoulder radiating to the mid arm  She experiences paresthesias in the left upper extremity since the initial fall although these do seem to be improving  She denies any neck pain  She denies any right shoulder or upper extremity complaints  PAST MEDICAL HISTORY:  Past Medical History:   Diagnosis Date    Anxiety     Depression     Lumbar herniated disc        PAST SURGICAL HISTORY:  Past Surgical History:   Procedure Laterality Date    HERNIA REPAIR      HYSTERECTOMY      KNEE SURGERY         FAMILY HISTORY:  Family History   Problem Relation Age of Onset    Stroke Mother     Hypertension Mother     Cancer Father     Hypertension Father     No Known Problems Sister     Diabetes Maternal Grandfather        SOCIAL HISTORY:  Social History     Tobacco Use    Smoking status: Current Every Day Smoker     Packs/day: 0 50    Smokeless tobacco: Never Used   Substance Use Topics    Alcohol use: Not Currently    Drug use: Not Currently       MEDICATIONS:    Current Outpatient Medications:     citalopram (CeleXA) 20 mg tablet, Take 20 mg by mouth daily, Disp: , Rfl:     naproxen (NAPROSYN) 500 mg tablet, Take 1 tablet (500 mg total) by mouth 2 (two) times a day with meals, Disp: 30 tablet, Rfl: 0    oxyCODONE-acetaminophen (PERCOCET) 5-325 mg per tablet, Take 1 tablet by mouth every 4 (four) hours as needed for moderate pain for up to 10 dosesMax Daily Amount: 6 tablets (Patient not taking: Reported on 2/10/2020), Disp: 10 tablet, Rfl: 0    ALLERGIES:  Allergies   Allergen Reactions    Penicillins        Review of systems:   Constitutional: Negative for fatigue, fever or loss of apetite  HENT: Negative  Respiratory: Negative for shortness of breath, dyspnea  Cardiovascular: Negative for chest pain/tightness  Gastrointestinal: Negative for abdominal pain, N/V  Endocrine: Negative for cold/heat intolerance, unexplained weight loss/gain  Genitourinary: Negative for flank pain, dysuria, hematuria  Musculoskeletal:  Positive as in the HPI   Skin: Negative for rash  Neurological:  Positive as in the HPI  Psychiatric/Behavioral: Negative for agitation    _____________________________________________________  PHYSICAL EXAMINATION:    Blood pressure 116/80, pulse 68, resp  rate 16, height 5' 8" (1 727 m), weight 76 6 kg (168 lb 14 oz)  General: well developed and well nourished, alert, oriented times 3 and appears comfortable  Psychiatric: Normal  HEENT: Benign  Cardiovascular: Regular    Pulmonary: No wheezing or stridor  Abdomen: Soft, Nontender  Skin: No masses, erythema, lacerations, fluctation, ulcerations  Neurovascular: Motor and sensory exams are grossly intact  Pulses are palpable  MUSCULOSKELETAL EXAMINATION:    The left shoulder exam demonstrates abduction to 130°, comparable to the contralateral right side but with pain during left shoulder range of motion  She has 120° of forward flexion complaining of pain greater than with abduction  Abduction/ER is to 80°, abduction/IR to 70° with pain  She has tenderness over the biceps tendon, the rotator cuff is nontender  Catalina's, Hall's, Whaley, Neer's, bear hug and horn blower tests are all painful but no weakness is noted  The remainder of the upper extremity examination bilaterally is benign  The cervical spine demonstrates good range of motion without complaints  DTRs bilateral upper extremities 2/4       _____________________________________________________  STUDIES REVIEWED:  X-rays of her shoulder from the emergency room visit demonstrated mild degenerative change at the acromioclavicular joint without acute abnormalities noted otherwise  The report was reviewed          Alek New

## 2021-03-10 DIAGNOSIS — Z23 ENCOUNTER FOR IMMUNIZATION: ICD-10-CM

## 2021-03-19 ENCOUNTER — IMMUNIZATIONS (OUTPATIENT)
Dept: FAMILY MEDICINE CLINIC | Facility: HOSPITAL | Age: 56
End: 2021-03-19

## 2021-03-19 DIAGNOSIS — Z23 ENCOUNTER FOR IMMUNIZATION: Primary | ICD-10-CM

## 2021-03-19 PROCEDURE — 91300 SARS-COV-2 / COVID-19 MRNA VACCINE (PFIZER-BIONTECH) 30 MCG: CPT

## 2021-03-19 PROCEDURE — 0001A SARS-COV-2 / COVID-19 MRNA VACCINE (PFIZER-BIONTECH) 30 MCG: CPT

## 2021-04-12 ENCOUNTER — IMMUNIZATIONS (OUTPATIENT)
Dept: FAMILY MEDICINE CLINIC | Facility: HOSPITAL | Age: 56
End: 2021-04-12

## 2021-04-12 DIAGNOSIS — Z23 ENCOUNTER FOR IMMUNIZATION: Primary | ICD-10-CM

## 2021-04-12 PROCEDURE — 91300 SARS-COV-2 / COVID-19 MRNA VACCINE (PFIZER-BIONTECH) 30 MCG: CPT

## 2021-04-12 PROCEDURE — 0002A SARS-COV-2 / COVID-19 MRNA VACCINE (PFIZER-BIONTECH) 30 MCG: CPT

## 2021-07-26 ENCOUNTER — HOSPITAL ENCOUNTER (EMERGENCY)
Facility: HOSPITAL | Age: 56
Discharge: HOME | End: 2021-07-26
Attending: EMERGENCY MEDICINE | Admitting: EMERGENCY MEDICINE
Payer: COMMERCIAL

## 2021-07-26 VITALS
WEIGHT: 168 LBS | OXYGEN SATURATION: 98 % | HEART RATE: 79 BPM | RESPIRATION RATE: 18 BRPM | TEMPERATURE: 97.7 F | DIASTOLIC BLOOD PRESSURE: 75 MMHG | HEIGHT: 68 IN | BODY MASS INDEX: 25.46 KG/M2 | SYSTOLIC BLOOD PRESSURE: 144 MMHG

## 2021-07-26 DIAGNOSIS — L03.114 CELLULITIS OF LEFT ELBOW: Primary | ICD-10-CM

## 2021-07-26 PROCEDURE — 99284 EMERGENCY DEPT VISIT MOD MDM: CPT | Performed by: EMERGENCY MEDICINE

## 2021-07-26 PROCEDURE — 99283 EMERGENCY DEPT VISIT LOW MDM: CPT

## 2021-07-26 RX ORDER — DOXYCYCLINE HYCLATE 100 MG/1
100 CAPSULE ORAL 2 TIMES DAILY
Qty: 14 CAPSULE | Refills: 0 | Status: SHIPPED | OUTPATIENT
Start: 2021-07-26 | End: 2021-08-02

## 2021-07-26 NOTE — ED PROVIDER NOTES
History  Chief Complaint   Patient presents with    Elbow Pain     2 Fridays ago started with a small blister and peeling skin on left elbow, states this week has increasing pain to area with mild swelling     10-12 days left elbow pain after picking at skin, now with small amount redness and swelling  No numbness/tingling  No fevers      History provided by:  Patient   used: No    Elbow Pain  Location:  Elbow  Elbow location:  L elbow  Pain details:     Quality: Redness/swelling minimal to no pain  Radiates to:  Does not radiate    Severity:  Mild    Onset quality:  Gradual    Duration:  10 days    Timing:  Constant    Progression:  Unchanged  Relieved by:  Nothing  Worsened by:  Nothing  Ineffective treatments:  None tried  Associated symptoms: no fever and no neck pain        Prior to Admission Medications   Prescriptions Last Dose Informant Patient Reported? Taking?   citalopram (CeleXA) 20 mg tablet   Yes Yes   Sig: Take 20 mg by mouth daily   naproxen (NAPROSYN) 500 mg tablet   No No   Sig: Take 1 tablet (500 mg total) by mouth 2 (two) times a day with meals   Patient taking differently: Take 500 mg by mouth as needed       Facility-Administered Medications: None       Past Medical History:   Diagnosis Date    Anxiety     Depression     Lumbar herniated disc        Past Surgical History:   Procedure Laterality Date    HERNIA REPAIR      HYSTERECTOMY      KNEE SURGERY         Family History   Problem Relation Age of Onset    Stroke Mother     Hypertension Mother     Cancer Father     Hypertension Father     No Known Problems Sister     Diabetes Maternal Grandfather      I have reviewed and agree with the history as documented      E-Cigarette/Vaping    E-Cigarette Use Never User      E-Cigarette/Vaping Substances     Social History     Tobacco Use    Smoking status: Current Some Day Smoker     Packs/day: 0 25     Types: Cigarettes    Smokeless tobacco: Never Used   Vaping Use    Vaping Use: Never used   Substance Use Topics    Alcohol use: Yes     Comment: ocassionally    Drug use: Not Currently       Review of Systems   Constitutional: Negative for chills and fever  HENT: Negative for ear pain, hearing loss, sore throat, trouble swallowing and voice change  Eyes: Negative for pain and discharge  Respiratory: Negative for cough, shortness of breath and wheezing  Cardiovascular: Negative for chest pain and palpitations  Gastrointestinal: Negative for abdominal pain, blood in stool, constipation, diarrhea, nausea and vomiting  Genitourinary: Negative for dysuria, flank pain, frequency and hematuria  Musculoskeletal: Negative for joint swelling, neck pain and neck stiffness  Skin: Negative for rash and wound  Neurological: Negative for dizziness, seizures, syncope, facial asymmetry and headaches  Psychiatric/Behavioral: Negative for hallucinations, self-injury and suicidal ideas  All other systems reviewed and are negative  Physical Exam  Physical Exam  Vitals and nursing note reviewed  Constitutional:       General: She is not in acute distress  Appearance: She is well-developed  She is not ill-appearing or diaphoretic  HENT:      Head: Normocephalic and atraumatic  Right Ear: External ear normal       Left Ear: External ear normal    Eyes:      General: No scleral icterus  Right eye: No discharge  Left eye: No discharge  Extraocular Movements: Extraocular movements intact  Conjunctiva/sclera: Conjunctivae normal    Pulmonary:      Effort: Pulmonary effort is normal  No respiratory distress  Musculoskeletal:         General: No deformity or signs of injury  Normal range of motion  Cervical back: Normal range of motion and neck supple  Comments: Small scab noted in the olecranon skin of the left elbow  Mild surrounding redness  No significant tenderness or warmth  No palpable effusions or fluid    Full range of motion  No bony tenderness  Skin:     General: Skin is warm and dry  Coloration: Skin is not jaundiced or pale  Neurological:      General: No focal deficit present  Mental Status: She is alert and oriented to person, place, and time  Cranial Nerves: No cranial nerve deficit  Coordination: Coordination normal       Gait: Gait normal    Psychiatric:         Mood and Affect: Mood normal          Behavior: Behavior normal          Thought Content: Thought content normal          Judgment: Judgment normal          Vital Signs  ED Triage Vitals [07/26/21 0913]   Temperature Pulse Respirations Blood Pressure SpO2   97 7 °F (36 5 °C) 79 18 144/75 98 %      Temp Source Heart Rate Source Patient Position - Orthostatic VS BP Location FiO2 (%)   Temporal Monitor Sitting Right arm --      Pain Score       8           Vitals:    07/26/21 0913   BP: 144/75   Pulse: 79   Patient Position - Orthostatic VS: Sitting         Visual Acuity      ED Medications  Medications - No data to display    Diagnostic Studies  Results Reviewed     None                 No orders to display              Procedures  Procedures         ED Course                             SBIRT 20yo+      Most Recent Value   SBIRT (22 yo +)   In order to provide better care to our patients, we are screening all of our patients for alcohol and drug use  Would it be okay to ask you these screening questions? Yes Filed at: 07/26/2021 0915   Initial Alcohol Screen: US AUDIT-C    1  How often do you have a drink containing alcohol? 2 Filed at: 07/26/2021 0915   2  How many drinks containing alcohol do you have on a typical day you are drinking? 0 Filed at: 07/26/2021 0915   3a  Male UNDER 65: How often do you have five or more drinks on one occasion? 0 Filed at: 07/26/2021 0915   3b  FEMALE Any Age, or MALE 65+: How often do you have 4 or more drinks on one occassion?   0 Filed at: 07/26/2021 0915   Audit-C Score  2 Filed at: 07/26/2021 0915   MILDRED: How many times in the past year have you    Used an illegal drug or used a prescription medication for non-medical reasons? Never Filed at: 07/26/2021 0915                    MDM    Disposition  Final diagnoses:   Cellulitis of left elbow     Time reflects when diagnosis was documented in both MDM as applicable and the Disposition within this note     Time User Action Codes Description Comment    7/26/2021  9:27 AM Arlen Bahena Add [S31 [id number]] Cellulitis of left elbow       ED Disposition     ED Disposition Condition Date/Time Comment    Discharge Stable Mon Jul 26, 2021  9:26 AM Nathanael Lopez discharge to home/self care  Follow-up Information     Follow up With Specialties Details Why Contact Info    Amandeep Reece MD Internal Medicine   [address] [address] [phone]            Discharge Medication List as of 7/26/2021  9:27 AM      START taking these medications    Details   doxycycline hyclate (VIBRAMYCIN) 100 mg capsule Take 1 capsule (100 mg total) by mouth 2 (two) times a day for 7 days, Starting Mon 7/26/2021, Until Mon 8/2/2021, Normal         CONTINUE these medications which have NOT CHANGED    Details   citalopram (CeleXA) 20 mg tablet Take 20 mg by mouth daily, Starting Mon 9/9/2019, Historical Med      naproxen (NAPROSYN) 500 mg tablet Take 1 tablet (500 mg total) by mouth 2 (two) times a day with meals, Starting Thu 2/6/2020, Normal           No discharge procedures on file      PDMP Review     None          ED Provider  Electronically Signed by           Gala Almaraz MD  07/26/21 0155

## 2021-08-06 ENCOUNTER — APPOINTMENT (EMERGENCY)
Dept: RADIOLOGY | Facility: HOSPITAL | Age: 56
End: 2021-08-06
Payer: COMMERCIAL

## 2021-08-06 ENCOUNTER — HOSPITAL ENCOUNTER (EMERGENCY)
Facility: HOSPITAL | Age: 56
Discharge: HOME | End: 2021-08-06
Attending: STUDENT IN AN ORGANIZED HEALTH CARE EDUCATION/TRAINING PROGRAM
Payer: COMMERCIAL

## 2021-08-06 VITALS
WEIGHT: 182.98 LBS | BODY MASS INDEX: 27.82 KG/M2 | OXYGEN SATURATION: 98 % | HEART RATE: 76 BPM | TEMPERATURE: 97.8 F | DIASTOLIC BLOOD PRESSURE: 80 MMHG | RESPIRATION RATE: 18 BRPM | SYSTOLIC BLOOD PRESSURE: 140 MMHG

## 2021-08-06 DIAGNOSIS — S46.002A ROTATOR CUFF INJURY, LEFT, INITIAL ENCOUNTER: ICD-10-CM

## 2021-08-06 DIAGNOSIS — M70.22 OLECRANON BURSITIS OF LEFT ELBOW: Primary | ICD-10-CM

## 2021-08-06 PROCEDURE — 73080 X-RAY EXAM OF ELBOW: CPT

## 2021-08-06 PROCEDURE — 99283 EMERGENCY DEPT VISIT LOW MDM: CPT

## 2021-08-06 PROCEDURE — 99282 EMERGENCY DEPT VISIT SF MDM: CPT | Performed by: PHYSICIAN ASSISTANT

## 2021-08-06 RX ORDER — NAPROXEN 500 MG/1
500 TABLET ORAL 2 TIMES DAILY WITH MEALS
Qty: 30 TABLET | Refills: 0 | Status: SHIPPED | OUTPATIENT
Start: 2021-08-06 | End: 2021-09-22

## 2021-08-06 NOTE — DISCHARGE INSTRUCTIONS
Please start taking the naproxen more regularly over the next few weeks to help with the inflammation  Please use a compressive dressing over your left elbow to help decrease the swelling    Please follow-up with orthopedics
CT surgery

## 2021-08-06 NOTE — ED PROVIDER NOTES
History  Chief Complaint   Patient presents with    Elbow Pain     2 weeks ago was put on doxycycline for L elbow  pt states L elbow swelling still the same and increase pain  denies fevers     The patient is a 68-year-old female presents emergency department today for re-evaluation of her left elbow pain  Patient states that 2 weeks ago she was evaluated and had a erythematous area over her left elbow  She did have a blister that she was picking at her skin  She was diagnosed with cellulitis and also has some bursitis swelling of the time  She was placed on doxycycline for 10 days and completed that 3 days ago  She states that the redness has improved however the swelling has been consistent over her left elbow  She any falls or injuries, fevers or chills  Elbow Pain  Location:  Elbow  Elbow location:  L elbow  Injury: no    Pain details:     Quality:  Aching    Radiates to:  Does not radiate    Severity:  Moderate    Onset quality:  Gradual    Timing:  Constant    Progression:  Unchanged  Handedness:  Right-handed  Dislocation: no    Foreign body present:  No foreign bodies  Tetanus status:  Unknown  Prior injury to area:  No  Relieved by:  Nothing  Worsened by:  Nothing  Ineffective treatments:  Rest and ice  Associated symptoms: no back pain, no decreased range of motion, no fatigue, no fever, no muscle weakness, no neck pain, no numbness and no stiffness        Prior to Admission Medications   Prescriptions Last Dose Informant Patient Reported?  Taking?   citalopram (CeleXA) 20 mg tablet   Yes No   Sig: Take 20 mg by mouth daily   naproxen (NAPROSYN) 500 mg tablet   No No   Sig: Take 1 tablet (500 mg total) by mouth 2 (two) times a day with meals   Patient taking differently: Take 500 mg by mouth as needed    naproxen (NAPROSYN) 500 mg tablet   No No   Sig: Take 1 tablet (500 mg total) by mouth 2 (two) times a day with meals      Facility-Administered Medications: None       Past Medical History: Diagnosis Date    Anxiety     Depression     Lumbar herniated disc        Past Surgical History:   Procedure Laterality Date    HERNIA REPAIR      HYSTERECTOMY      KNEE SURGERY         Family History   Problem Relation Age of Onset    Stroke Mother     Hypertension Mother     Cancer Father     Hypertension Father     No Known Problems Sister     Diabetes Maternal Grandfather      I have reviewed and agree with the history as documented  E-Cigarette/Vaping    E-Cigarette Use Never User      E-Cigarette/Vaping Substances     Social History     Tobacco Use    Smoking status: Current Some Day Smoker     Packs/day: 0 25     Types: Cigarettes    Smokeless tobacco: Never Used   Vaping Use    Vaping Use: Never used   Substance Use Topics    Alcohol use: Yes     Comment: ocassionally    Drug use: Not Currently       Review of Systems   Constitutional: Negative for fatigue and fever  Musculoskeletal: Negative for back pain, neck pain and stiffness  All other systems reviewed and are negative  Physical Exam  Physical Exam  Vitals and nursing note reviewed  Constitutional:       General: She is not in acute distress  Appearance: She is well-developed  HENT:      Head: Normocephalic and atraumatic  Eyes:      Extraocular Movements: Extraocular movements intact  Conjunctiva/sclera: Conjunctivae normal       Pupils: Pupils are equal, round, and reactive to light  Cardiovascular:      Rate and Rhythm: Normal rate and regular rhythm  Heart sounds: No murmur heard  Pulmonary:      Effort: Pulmonary effort is normal  No respiratory distress  Breath sounds: Normal breath sounds  Abdominal:      Palpations: Abdomen is soft  Tenderness: There is no abdominal tenderness  There is no right CVA tenderness or left CVA tenderness  Musculoskeletal:      Left upper arm: Normal       Left elbow: Normal range of motion        Left forearm: Normal       Cervical back: Neck supple  Right lower leg: No edema  Left lower leg: No edema  Comments: Patient has a left elbow bursitis  No erythema to the skin no drainage  Patient has full range of motion of the left elbow able to supinate and pronate the forearm   Skin:     General: Skin is warm and dry  Capillary Refill: Capillary refill takes less than 2 seconds  Neurological:      General: No focal deficit present  Mental Status: She is alert and oriented to person, place, and time  Vital Signs  ED Triage Vitals [08/06/21 1042]   Temperature Pulse Respirations Blood Pressure SpO2   97 8 °F (36 6 °C) 83 18 140/80 98 %      Temp Source Heart Rate Source Patient Position - Orthostatic VS BP Location FiO2 (%)   Temporal Monitor Lying Right arm --      Pain Score       --           Vitals:    08/06/21 1042 08/06/21 1045   BP: 140/80 140/80   Pulse: 83 76   Patient Position - Orthostatic VS: Lying          Visual Acuity      ED Medications  Medications - No data to display    Diagnostic Studies  Results Reviewed     None                 XR elbow 3+ vw LEFT   ED Interpretation by Kumar Post PA-C (08/06 1120)   Unremarkable                 Procedures  Procedures         ED Course                             SBIRT 22yo+      Most Recent Value   SBIRT (23 yo +)   In order to provide better care to our patients, we are screening all of our patients for alcohol and drug use  Would it be okay to ask you these screening questions? No Filed at: 08/06/2021 1043                    MDM  Number of Diagnoses or Management Options  Olecranon bursitis of left elbow  Diagnosis management comments: Patient was diagnosed with bursitis  Patient was instructed naproxen b i d  To help with swelling as well as a compressive Ace wrap dressing  Was given referral to Orthopedics and contact Orthopedics for further evaluation if this were to persist   X-ray was unremarkable    Patient expressed understanding and was in agreement with treatment plan  Amount and/or Complexity of Data Reviewed  Tests in the radiology section of CPT®: ordered and reviewed  Decide to obtain previous medical records or to obtain history from someone other than the patient: yes  Review and summarize past medical records: yes  Independent visualization of images, tracings, or specimens: yes    Risk of Complications, Morbidity, and/or Mortality  Presenting problems: low  Diagnostic procedures: low  Management options: low    Patient Progress  Patient progress: stable      Disposition  Final diagnoses:   Olecranon bursitis of left elbow     Time reflects when diagnosis was documented in both MDM as applicable and the Disposition within this note     Time User Action Codes Description Comment    8/6/2021 10:47 AM Pleasant Leys Add [M70 22] Olecranon bursitis of left elbow     8/6/2021 10:52 AM Pleasant Leys Add [S46 002A] Rotator cuff injury, left, initial encounter       ED Disposition     ED Disposition Condition Date/Time Comment    Discharge Stable Fri Aug 6, 2021 11:20 AM Keara Schulte discharge to home/self care              Follow-up Information     Follow up With Specialties Details Why [address] [facility] Orthopedic Surgery Schedule an appointment as soon as possible for a visit   [address]  [address] [facility]  [phone]            Patient's Medications   Discharge Prescriptions    No medications on file         PDMP Review     None          ED Provider  Electronically Signed by           Lucio Molina PA-C  08/06/21 0253

## 2021-09-22 ENCOUNTER — HOSPITAL ENCOUNTER (OUTPATIENT)
Dept: RADIOLOGY | Facility: CLINIC | Age: 56
Discharge: HOME | End: 2021-09-22
Payer: COMMERCIAL

## 2021-09-22 ENCOUNTER — CONSULT (OUTPATIENT)
Dept: PAIN MEDICINE | Facility: CLINIC | Age: 56
End: 2021-09-22
Payer: COMMERCIAL

## 2021-09-22 VITALS
TEMPERATURE: 98.2 F | RESPIRATION RATE: 20 BRPM | SYSTOLIC BLOOD PRESSURE: 122 MMHG | BODY MASS INDEX: 26.42 KG/M2 | HEIGHT: 69 IN | WEIGHT: 178.4 LBS | DIASTOLIC BLOOD PRESSURE: 66 MMHG

## 2021-09-22 DIAGNOSIS — M54.16 LUMBAR RADICULOPATHY: Primary | ICD-10-CM

## 2021-09-22 DIAGNOSIS — M54.16 LUMBAR RADICULOPATHY: ICD-10-CM

## 2021-09-22 PROCEDURE — 72100 X-RAY EXAM L-S SPINE 2/3 VWS: CPT

## 2021-09-22 PROCEDURE — 99204 OFFICE O/P NEW MOD 45 MIN: CPT | Performed by: ANESTHESIOLOGY

## 2021-09-22 RX ORDER — MELOXICAM 7.5 MG/1
7.5 TABLET ORAL DAILY
Qty: 60 TABLET | Refills: 0 | Status: SHIPPED | OUTPATIENT
Start: 2021-09-22 | End: 2021-11-05

## 2021-09-22 RX ORDER — GABAPENTIN 300 MG/1
300 CAPSULE ORAL 3 TIMES DAILY
Qty: 90 CAPSULE | Refills: 0 | Status: SHIPPED | OUTPATIENT
Start: 2021-09-22 | End: 2021-09-23

## 2021-09-22 NOTE — PROGRESS NOTES
Assessment  1  Lumbar radiculopathy  -     Ambulatory referral to Physical Therapy; Future  -     X-ray lumbar spine 2 or 3 views; Future; Expected date: 09/22/2021    Chronic axial low back pain with primarily arthritic features indication radicular features and left L3 and L4 dermatomal distribution accompanied by paresthesias and numbness  Debilitating pain with respect to participation with independent activities of daily living  Positive facet loading maneuvers bilaterally  5/5 strength in all extremities with active range of motion movements bilaterally  Reasonable to proceed with multimodal pain therapy plan as noted below  Plan  -xray lumbar spine  -rtc in 4 weeks to consider MRI lumbar spine after PT  -gabapentin 300 mg t i d  Ordered for patient; counseled regarding sedative effects of taking this medication and provided up titration calendar  Counseled not to take medication while driving or operating heavy machinery/using stairs  -meloxicam 7 5 mg b i d  Prescribed  Patient educated regarding bleeding risk of taking this medication not taking any other nonsteroidal anti-inflammatory medications while taking this medication; counseled thoroughly regarding potential risk of Cardiovascular injury, Kidney injury, Gastrointestinal ulceration/bleeding  Patient voiced understanding  -physical therapy for right-sided lumbar radiculopathy; Core exercises additionally provided for physician directed home PT that the patient plans to participate with for 1 hour, twice a week for the next 6 weeks  There are risks associated with opioid medications, including dependence, addiction and tolerance  The patient understands and agrees to use these medications only as prescribed  Potential side effects of the medications include, but are not limited to, constipation, drowsiness, addiction, impaired judgment and risk of fatal overdose if not taken as prescribed   The patient was warned against driving while taking sedation medications  Sharing medications is a felony  At this point in time, the patient is showing no signs of addiction, abuse, diversion or suicidal ideation  [address] Prescription Drug Monitoring Program report was reviewed and was appropriate     Complete risks and benefits including bleeding, infection, tissue reaction, nerve injury and allergic reaction were discussed  The approach was demonstrated using models and literature was provided  Verbal and written consent was obtained  My impressions and treatment recommendations were discussed in detail with the patient who verbalized understanding and had no further questions  Discharge instructions were provided  I personally saw and examined the patient and I agree with the above discussed plan of care  No orders of the defined types were placed in this encounter  History of Present Illness    Ulysses Pepper is a 61 y o  female with a past medical history of depression presenting with chronic axial low back pain described primarily as arthritic in nature  She describes 8/10 low back pain that is worse in the mornings and worse at the end of the day  The pain is characterized by achy, nagging, indolent, crampy, stabbing pain in her axial low back  The patient describes that the pain is worse with standing for long periods of time on hard surfaces as well as with walking  The patient is a very active individual and feels as though this pain compromises his/her participation with independent activities of daily living  The pain can be debilitating at times and contribute to significant disability, compromising overall activity and independent activities of daily living  She has tried physical therapy in the past with limited relief of symptoms  Medications the patient has tried in the past include nsaids, tylenol  She describes left-sided L3 and L4 radicular symptoms but otherwise has good strength    She endorses numbness or paresthesias in the aforementioned dermatomes  The patient denies any bowel or bladder dysfunction as well  I have personally reviewed and/or updated the patient's past medical history, past surgical history, family history, social history, current medications, allergies, and vital signs today  Review of Systems   Constitutional: Positive for activity change  HENT: Negative  Eyes: Negative  Respiratory: Negative  Cardiovascular: Negative  Gastrointestinal: Negative  Endocrine: Negative  Genitourinary: Negative  Musculoskeletal: Positive for arthralgias, back pain, gait problem and myalgias  Skin: Negative  Allergic/Immunologic: Negative  Neurological: Positive for weakness and numbness  Hematological: Negative  Psychiatric/Behavioral: Negative  All other systems reviewed and are negative        Patient Active Problem List   Diagnosis    Bicipital tendinitis of left shoulder    Acute pain of left shoulder       Past Medical History:   Diagnosis Date    Anxiety     Depression     Lumbar herniated disc        Past Surgical History:   Procedure Laterality Date    HERNIA REPAIR      HYSTERECTOMY      KNEE SURGERY         Family History   Problem Relation Age of Onset    Stroke Mother     Hypertension Mother     Cancer Father     Hypertension Father     No Known Problems Sister     Diabetes Maternal Grandfather        Social History     Occupational History    Not on file   Tobacco Use    Smoking status: Current Some Day Smoker     Packs/day: 0 25     Types: Cigarettes    Smokeless tobacco: Never Used   Vaping Use    Vaping Use: Never used   Substance and Sexual Activity    Alcohol use: Yes     Comment: ocassionally    Drug use: Not Currently    Sexual activity: Not on file       Current Outpatient Medications on File Prior to Visit   Medication Sig    citalopram (CeleXA) 20 mg tablet Take 20 mg by mouth daily    [DISCONTINUED] naproxen (NAPROSYN) 500 mg tablet Take 1 tablet (500 mg total) by mouth 2 (two) times a day with meals     No current facility-administered medications on file prior to visit  Allergies   Allergen Reactions    Treenut [Nuts - Food Allergy] Anaphylaxis    Penicillins Other (See Comments)     From childhood         Physical Exam    /66   Temp 98 2 °F (36 8 °C)   Resp 20   Ht 5' 9 25" (1 759 m)   Wt 80 9 kg (178 lb 6 4 oz)   BMI 26 16 kg/m²     Constitutional: normal, well developed, well nourished, alert, in no distress and non-toxic and no overt pain behavior  Eyes: anicteric  HEENT: grossly intact  Neck: supple, symmetric, trachea midline and no masses   Pulmonary:even and unlabored  Cardiovascular:No edema or pitting edema present  Skin:Normal without rashes or lesions and well hydrated  Psychiatric:Mood and affect appropriate  Neurologic:Cranial Nerves II-XII grossly intact Sensation grossly intact; no clonus negative gauthier's  Reflexes 2+ and brisk  SLR negative bilaterally  Musculoskeletal:normal gait  5/5 strength in all extremities with active range of motion movements bilaterally  Normal heel toe and tip toe walking   significant pain with lumbar facet loading bilaterally and with lateral spine rotation  No ttp over lumbar paraspinal muscles  Negative getachew's test, negative gaenslen's negative SIJ loading bilaterally      Imaging      No pertinent imaging to review

## 2021-09-22 NOTE — PATIENT INSTRUCTIONS
Core Strengthening Exercises   WHAT YOU NEED TO KNOW:   Your core includes the muscles of your lower back, hip, pelvis, and abdomen  Core strengthening exercises help heal and strengthen these muscles  This helps prevent another injury, and keeps your pelvis, spine, and hips in the correct position  DISCHARGE INSTRUCTIONS:   Contact your healthcare provider if:   · You have sharp or worsening pain during exercise or at rest     · You have questions or concerns about your shoulder exercises  Safety tips:  Talk to your healthcare provider before you start an exercise program  A physical therapist can teach you how to do core strengthening exercises safely  · Do the exercises on a mat or firm surface  A firm surface will support your spine and prevent low back pain  Do not do these exercises on a bed  · Move slowly and smoothly  Avoid fast or jerky motions  · Stop if you feel pain  Core exercises should not be painful  Stop if you feel pain  · Breathe normally during core exercises  Do not hold your breath  This may cause an increase in blood pressure and prevent muscle strengthening  Your healthcare provider will tell you when to inhale and exhale during the exercise  · Begin all of your exercises with abdominal bracing  Abdominal bracing helps warm up your core muscles  You can also practice abdominal bracing throughout the day  Lie on your back with your knees bent and feet flat on the floor  Place your arms in a relaxed position beside your body  Tighten your abdominal muscles  Pull your belly button in and up toward your spine  Hold for 5 seconds  Relax your muscles  Repeat 10 times  Core strengthening exercises: Your healthcare provider will tell you how often to do these exercises  The provider will also tell you how many repetitions of each exercise you should do  Hold each exercise for 5 seconds or as directed  As you get stronger, increase your hold to 10 to 15 seconds   You can do some of these exercises on a stability ball, or with a weight  Ask your healthcare provider how to use a stability ball or weight for these exercises:  · Bridging:  Lie on your back with your knees bent and feet flat on the floor  Rest your arms at your side  Tighten your buttocks, and then lift your hips 1 inch off the floor  Hold for 5 seconds  When you can do this exercise without pain for 10 seconds, increase the distance you lift your hips  A good goal is to be able to lift your hips so that your shoulders, hips, and knees are in a straight line  · Dead bug:  Lie on your back with your knees bent and feet flat on the floor  Place your arms in a relaxed position beside your body  Begin with abdominal bracing  Next, raise one leg, keeping your knee bent  Hold for 5 seconds  Repeat with the other leg  When you can do this exercise without pain for 10 to 15 seconds, you may raise one straight leg and hold  Repeat with the other leg  · Quadruped:  Place your hands and knees on the floor  Keep your wrists directly below your shoulders and your knees directly below your hips  Pull your belly button in toward your spine  Do not flatten or arch your back  Tighten your abdominal muscles below your belly button  Hold for 5 seconds  When you can do this exercise without pain for 10 to 15 seconds, you may extend one arm and hold  Repeat on the other side  · Side bridge exercises:      ? Standing side bridge:  Stand next to a wall and extend one arm toward the wall  Place your palm flat on the wall with your fingers pointing upward  Begin with abdominal bracing  Next, without moving your feet, slowly bend your arm to 90 degrees  Hold for 5 seconds  Repeat on the other side  When you can do this exercise without pain for 10 to 15 seconds, you may do the bent leg side bridge on the floor  ? Bent leg side bridge:  Lie on one side with your legs, hips, and shoulders in a straight line   Prop yourself up onto your forearm so your elbow is directly below your shoulder  Bend your knees back to 90 degrees  Begin with abdominal bracing  Next, lift your hips and balance yourself on your forearm and knees  Hold for 5 seconds  Repeat on the other side  When you can do this exercise without pain for 10 to 15 seconds, you may do the straight leg side bridge on the floor  ? Straight leg side bridge:  Lie on one side with your legs, hips, and shoulders in a straight line  Prop yourself up onto your forearm so your elbow is directly below your shoulder  Begin with abdominal bracing  Lift your hips off the floor and balance yourself on your forearm and the outside of your flexed foot  Do not let your ankle bend sideways  Hold for 5 seconds  Repeat on the other side  When you can do this exercise without pain for 10 to 15 seconds, ask your healthcare provider for more advanced exercises  · Superman:  Lie on your stomach  Extend your arms forward on the floor  Tighten your abdominal muscles and lift your right hand and left leg off the floor  Hold this position  Slowly return to the starting position  Tighten your abdominal muscles and lift your left hand and right leg off the floor  Hold this position  Slowly return to the starting position  · Clam:  Lie on your side with your knees bent  Put your bottom arm under your head to keep your neck in line  Put your top hand on your hip to keep your pelvis from moving  Put your heels together, and keep them together during this exercise  Slowly raise your top knee toward the ceiling  Then lower your leg so your knees are together  Repeat this exercise 10 times  Then switch sides and do the exercise 10 times with the other leg  · Curl up:  Lie on your back with your knees bent and feet flat on the floor  Place your hands, palms down, underneath your lower back   Next, with your elbows on the floor, lift your shoulders and chest 2 to 3 inches off the floor  Keep your head in line with your shoulders  Hold this position  Slowly return to the starting position  · Straight leg raises:  Lie on your back with one leg straight  Bend the other knee and place your foot flat on the floor  Tighten your abdominal muscles  Keep your leg straight and slowly lift it straight up 6 to 12 inches off the floor  Hold this position  Lower your leg slowly  Do as many repetitions as directed on this side  Repeat with the other leg  · Plank:  Lie on your stomach  Bend your elbows and place your forearms flat on the floor  Lift your chest, stomach, and knees off the floor  Make sure your elbows are below your shoulders  Your body should be in a straight line  Do not let your hips or lower back sink to the ground  Squeeze your abdominal muscles together and hold for 15 seconds  To make this exercise harder, hold for 30 seconds or lift 1 leg at a time  · Bicycles:  Lie on your back  Bend both knees and bring them toward your chest  Your calves should be parallel to the floor  Place the palms of your hands on the back of your head  Straighten your right leg and keep it lifted 2 inches off the floor  Raise your head and shoulders off the floor and twist towards your left  Keep your head and shoulders lifted  Bend your right knee while you straighten your left leg  Keep your left leg 2 inches off the floor  Twist your head and chest towards the left leg  Continue to straighten 1 leg at a time and twist        Follow up with your healthcare provider as directed:  Write down your questions so you remember to ask them during your visits  © Copyright [employer] 2021 Information is for End User's use only and may not be sold, redistributed or otherwise used for commercial purposes  All illustrations and images included in CareNotes® are the copyrighted property of [employer]  or [facility]   The above information is an  only   It is not intended as medical advice for individual conditions or treatments  Talk to your doctor, nurse or pharmacist before following any medical regimen to see if it is safe and effective for you

## 2021-09-23 DIAGNOSIS — M54.16 LUMBAR RADICULOPATHY: ICD-10-CM

## 2021-09-23 RX ORDER — GABAPENTIN 300 MG/1
CAPSULE ORAL
Qty: 90 CAPSULE | Refills: 0 | Status: SHIPPED | OUTPATIENT
Start: 2021-09-23 | End: 2021-11-05

## 2021-09-25 NOTE — PROGRESS NOTES
PT Evaluation     Today's date: 2021  Patient name: Lizette Martinez  : 1965  MRN: [id number]  Referring provider: Manny Travis MD  Dx:   Encounter Diagnosis     ICD-10-CM    1  Lumbar radiculopathy  M54 16 Ambulatory referral to Physical Therapy                  Assessment  Assessment details: PT notes the patient with decrease ROM and strength t/o the lumbar spine with noted trunk/core weakness leading to increase pain levels and functional limitations with need for course of skilled therapy for 6 weeks with focus on lumbar stabilization, manual therapy, analgesic modalities, posture, and HEP  PT notes recommendation to patient to contact MD about issues with new medications  PT notes the patient with chronic nature of pain which may prolong course of skilled and/or impair prognosis  Impairments: abnormal or restricted ROM, activity intolerance, impaired physical strength, lacks appropriate home exercise program and pain with function  Understanding of Dx/Px/POC: good   Prognosis: fair    Goals  ST  Initiate HEP  2  Decrease pain levels by 25-50%   3  Increase strength by 1-2 mm grades  4  Increase ROM by 25-50%   LT  Improve spinal stability with improved trunk/core strength  2  No LE radicular symptoms  3  Decrease limitations with standing and walking  4  Decrease limitations with lifting and trunk movement  5  Decrease limitations with ADL  6   DC with HEP     Plan  Plan details: PT notes review of POC and findings with patient who is in agreement with PT recommendations of course of skilled therapy      Patient would benefit from: PT eval and skilled physical therapy  Planned modality interventions: thermotherapy: hydrocollator packs  Planned therapy interventions: manual therapy, neuromuscular re-education, patient education, self care, strengthening, stretching, therapeutic exercise, home exercise program, graded exercise and flexibility  Frequency: 2x week  Duration in weeks: 6  Treatment plan discussed with: patient        Subjective Evaluation    History of Present Illness  Mechanism of injury: Patient reports symptoms started in the low back about 7 years ago while lifting and twisting while carrying box at work  Patient reports development of immediate LBP with progressive worsening over time  Patient reports over the years varying degrees of LBP leading to limitations with standing, lifting, trunk movement, carrying, walking, and ADL  Patient reports a recent flare up in symptoms while helping a friend move leading to increase pain levels in the low back as well as left thigh numbness  Patient reports over the years she has been treated with medication and analgesic injections in the lumbar spine with varying degrees of relief  Patient reports she recently transitioned to new pain management MD for evaluation and treatment of symptoms  Patient reports she was referred to OPPT to address lumbar radiculopathy  Patient reports she is unemployed with significant PMH of chronic LBP and right knee arthroscopic repair  Patient reports she feels very nauseous and groggy/sleepy with the new prescriptions medications  Pain  Current pain ratin  At best pain ratin  At worst pain rating: 10  Location: Lumbar spine   Quality: sharp, pulling, tight, radiating, discomfort and pressure  Relieving factors: rest and medications  Aggravating factors: standing, lifting and walking    Treatments  Previous treatment: medication, physical therapy and injection treatment  Current treatment: medication and physical therapy  Patient Goals  Patient goals for therapy: decreased pain, increased motion, increased strength and independence with ADLs/IADLs          Objective     Postural Observations  Seated posture: fair  Standing posture: fair        Palpation   Left   Muscle spasm in the erector spinae and lumbar paraspinals  Tenderness of the erector spinae and lumbar paraspinals  Right   Muscle spasm in the erector spinae and lumbar paraspinals  Tenderness of the erector spinae and lumbar paraspinals  Tenderness     Left Hip   Tenderness in the PSIS  No tenderness in the greater trochanter and sacroiliac joint  Right Hip   No tenderness in the PSIS, greater trochanter and sacroiliac joint  Neurological Testing     Reflexes   Left   Patellar (L4): normal (2+)  Achilles (S1): normal (2+)    Right   Patellar (L4): normal (2+)  Achilles (S1): normal (2+)    Active Range of Motion     Lumbar   Flexion: 47 degrees  with pain  Extension: 14 degrees  with pain  Left lateral flexion: 24 degrees    with pain  Right lateral flexion: 21 degrees  with pain  Left rotation: 13 degrees  with pain  Right rotation: 15 degrees  with pain  Left Hip   Flexion: 73 degrees   Extension: WFL  Abduction: WFL  External rotation (90/90): 24 degrees   Internal rotation (90/90): WFL    Right Hip   Flexion: 75 degrees   Extension: WFL  Abduction: WFL  External rotation (90/90): 25 degrees   Internal rotation (90/90): WFL  Left Knee   Normal active range of motion    Right Knee   Normal active range of motion    Additional Active Range of Motion Details  PT notes decrease ROM and strength t/o the lumbar spine with noted trunk/core weakness with abdominals of 2/5 and lumbar paraspinals of 2+/5     Passive Range of Motion   Left Hip   Normal passive range of motion    Right Hip   Normal passive range of motion    Strength/Myotome Testing     Left Hip   Planes of Motion   Flexion: 4+  Extension: 4+  Abduction: 4+  Adduction: 5  External rotation: 4+  Internal rotation: 4+    Right Hip   Planes of Motion   Flexion: 4+  Extension: 4+  Abduction: 4+  Adduction: 5  External rotation: 4+  Internal rotation: 4+    Left Knee   Normal strength  Quadriceps contraction: good    Right Knee   Normal strength  Quadriceps contraction: good    Tests     Left Hip   Negative KEVON, FADIR and long sit  Nirmal: Negative  90/90 SLR: Negative  Right Hip   Negative ELDER LUND and long sit  Nirmal: Negative  90/90 SLR: Negative  Ambulation     Observational Gait   Gait: antalgic   Walking speed and stride length within functional limits       Additional Observational Gait Details  Guarded posture              Precautions:  Chronic LBP       Manuals 9/28       Lumbar PA mobs and manual lumbar traction                                 Neuro Re-Ed        Stand lumbar extension against bar         Stand OAL         PPT        Bridge         Supine Tball ABD crunch         TB Lumbar rotation         Seated Tball trunk 3 way flexion        Seated trunk rotation and PNF                         Ther Ex        Treadmill        SKTC         Supine Tball LTR                                         HEP update/review  15 min        Ther Activity                        Gait Training                        Modalities        MHP to the LB in seated  15 min

## 2021-09-28 ENCOUNTER — EVALUATION (OUTPATIENT)
Dept: PHYSICAL THERAPY | Facility: CLINIC | Age: 56
End: 2021-09-28
Payer: COMMERCIAL

## 2021-09-28 ENCOUNTER — TELEPHONE (OUTPATIENT)
Dept: PAIN MEDICINE | Facility: CLINIC | Age: 56
End: 2021-09-28

## 2021-09-28 DIAGNOSIS — M54.16 LUMBAR RADICULOPATHY: ICD-10-CM

## 2021-09-28 PROCEDURE — 97535 SELF CARE MNGMENT TRAINING: CPT | Performed by: PHYSICAL THERAPIST

## 2021-09-28 PROCEDURE — 97162 PT EVAL MOD COMPLEX 30 MIN: CPT | Performed by: PHYSICAL THERAPIST

## 2021-09-28 NOTE — TELEPHONE ENCOUNTER
Pt is on her 3rd say of gabapentin and she is having dizziness, light headed, feels all numb, memory problems, body is not functioning with what her body is telling her      Pt # [phone]

## 2021-09-28 NOTE — TELEPHONE ENCOUNTER
S/w pt, stated that she has completed gabapentin qd x 3 days, bid x 3 days and should increase to TID today however she is having difficulty with Se's  Pt stated that she feels she is getting some relief however, se's are interfering w/ adl  Advised pt, will d/w Dr Nii Doran and cb to advise  Pt verbalized understandign and appreciation

## 2021-10-01 ENCOUNTER — APPOINTMENT (OUTPATIENT)
Dept: PHYSICAL THERAPY | Facility: CLINIC | Age: 56
End: 2021-10-01
Payer: COMMERCIAL

## 2021-10-04 ENCOUNTER — OFFICE VISIT (OUTPATIENT)
Dept: PHYSICAL THERAPY | Facility: CLINIC | Age: 56
End: 2021-10-04
Payer: COMMERCIAL

## 2021-10-04 DIAGNOSIS — M54.16 LUMBAR RADICULOPATHY: Primary | ICD-10-CM

## 2021-10-04 PROCEDURE — 97110 THERAPEUTIC EXERCISES: CPT

## 2021-10-04 PROCEDURE — 97112 NEUROMUSCULAR REEDUCATION: CPT

## 2021-10-04 PROCEDURE — 97140 MANUAL THERAPY 1/> REGIONS: CPT

## 2021-10-07 ENCOUNTER — APPOINTMENT (OUTPATIENT)
Dept: PHYSICAL THERAPY | Facility: CLINIC | Age: 56
End: 2021-10-07
Payer: COMMERCIAL

## 2021-10-12 ENCOUNTER — OFFICE VISIT (OUTPATIENT)
Dept: PHYSICAL THERAPY | Facility: CLINIC | Age: 56
End: 2021-10-12
Payer: COMMERCIAL

## 2021-10-12 DIAGNOSIS — M54.16 LUMBAR RADICULOPATHY: Primary | ICD-10-CM

## 2021-10-12 PROCEDURE — 97140 MANUAL THERAPY 1/> REGIONS: CPT

## 2021-10-12 PROCEDURE — 97112 NEUROMUSCULAR REEDUCATION: CPT

## 2021-10-12 PROCEDURE — 97110 THERAPEUTIC EXERCISES: CPT

## 2021-10-14 ENCOUNTER — OFFICE VISIT (OUTPATIENT)
Dept: PHYSICAL THERAPY | Facility: CLINIC | Age: 56
End: 2021-10-14
Payer: COMMERCIAL

## 2021-10-14 DIAGNOSIS — M54.16 LUMBAR RADICULOPATHY: Primary | ICD-10-CM

## 2021-10-14 PROCEDURE — 97140 MANUAL THERAPY 1/> REGIONS: CPT | Performed by: PHYSICAL THERAPIST

## 2021-10-14 PROCEDURE — 97110 THERAPEUTIC EXERCISES: CPT | Performed by: PHYSICAL THERAPIST

## 2021-10-14 PROCEDURE — 97112 NEUROMUSCULAR REEDUCATION: CPT | Performed by: PHYSICAL THERAPIST

## 2021-10-15 ENCOUNTER — APPOINTMENT (OUTPATIENT)
Dept: PHYSICAL THERAPY | Facility: CLINIC | Age: 56
End: 2021-10-15
Payer: COMMERCIAL

## 2021-10-18 ENCOUNTER — OFFICE VISIT (OUTPATIENT)
Dept: PHYSICAL THERAPY | Facility: CLINIC | Age: 56
End: 2021-10-18
Payer: COMMERCIAL

## 2021-10-18 DIAGNOSIS — M54.16 LUMBAR RADICULOPATHY: Primary | ICD-10-CM

## 2021-10-18 PROCEDURE — 97112 NEUROMUSCULAR REEDUCATION: CPT

## 2021-10-18 PROCEDURE — 97110 THERAPEUTIC EXERCISES: CPT

## 2021-10-18 PROCEDURE — 97140 MANUAL THERAPY 1/> REGIONS: CPT

## 2021-10-21 ENCOUNTER — APPOINTMENT (OUTPATIENT)
Dept: PHYSICAL THERAPY | Facility: CLINIC | Age: 56
End: 2021-10-21
Payer: COMMERCIAL

## 2021-10-22 ENCOUNTER — OFFICE VISIT (OUTPATIENT)
Dept: PHYSICAL THERAPY | Facility: CLINIC | Age: 56
End: 2021-10-22
Payer: COMMERCIAL

## 2021-10-22 DIAGNOSIS — M54.16 LUMBAR RADICULOPATHY: Primary | ICD-10-CM

## 2021-10-22 PROCEDURE — 97110 THERAPEUTIC EXERCISES: CPT

## 2021-10-22 PROCEDURE — 97112 NEUROMUSCULAR REEDUCATION: CPT

## 2021-10-25 ENCOUNTER — EVALUATION (OUTPATIENT)
Dept: PHYSICAL THERAPY | Facility: CLINIC | Age: 56
End: 2021-10-25
Payer: COMMERCIAL

## 2021-10-25 DIAGNOSIS — M54.16 LUMBAR RADICULOPATHY: Primary | ICD-10-CM

## 2021-10-25 PROCEDURE — 97112 NEUROMUSCULAR REEDUCATION: CPT

## 2021-10-25 PROCEDURE — 97110 THERAPEUTIC EXERCISES: CPT

## 2021-10-25 PROCEDURE — 97140 MANUAL THERAPY 1/> REGIONS: CPT | Performed by: PHYSICAL THERAPIST

## 2021-10-28 ENCOUNTER — APPOINTMENT (OUTPATIENT)
Dept: PHYSICAL THERAPY | Facility: CLINIC | Age: 56
End: 2021-10-28
Payer: COMMERCIAL

## 2021-10-29 ENCOUNTER — APPOINTMENT (OUTPATIENT)
Dept: PHYSICAL THERAPY | Facility: CLINIC | Age: 56
End: 2021-10-29
Payer: COMMERCIAL

## 2021-11-01 ENCOUNTER — OFFICE VISIT (OUTPATIENT)
Dept: PHYSICAL THERAPY | Facility: CLINIC | Age: 56
End: 2021-11-01
Payer: COMMERCIAL

## 2021-11-01 DIAGNOSIS — M54.16 LUMBAR RADICULOPATHY: Primary | ICD-10-CM

## 2021-11-01 PROCEDURE — 97110 THERAPEUTIC EXERCISES: CPT

## 2021-11-01 PROCEDURE — 97112 NEUROMUSCULAR REEDUCATION: CPT

## 2021-11-03 ENCOUNTER — OFFICE VISIT (OUTPATIENT)
Dept: PHYSICAL THERAPY | Facility: CLINIC | Age: 56
End: 2021-11-03
Payer: COMMERCIAL

## 2021-11-03 DIAGNOSIS — M54.16 LUMBAR RADICULOPATHY: Primary | ICD-10-CM

## 2021-11-03 PROCEDURE — 97112 NEUROMUSCULAR REEDUCATION: CPT

## 2021-11-03 PROCEDURE — 97110 THERAPEUTIC EXERCISES: CPT

## 2021-11-05 ENCOUNTER — APPOINTMENT (OUTPATIENT)
Dept: PHYSICAL THERAPY | Facility: CLINIC | Age: 56
End: 2021-11-05
Payer: COMMERCIAL

## 2021-11-05 ENCOUNTER — OFFICE VISIT (OUTPATIENT)
Dept: PAIN MEDICINE | Facility: CLINIC | Age: 56
End: 2021-11-05
Payer: COMMERCIAL

## 2021-11-05 VITALS
TEMPERATURE: 96.6 F | SYSTOLIC BLOOD PRESSURE: 118 MMHG | HEIGHT: 69 IN | WEIGHT: 180.2 LBS | BODY MASS INDEX: 26.69 KG/M2 | DIASTOLIC BLOOD PRESSURE: 62 MMHG | RESPIRATION RATE: 20 BRPM | HEART RATE: 79 BPM

## 2021-11-05 DIAGNOSIS — M54.16 LUMBAR RADICULOPATHY: Primary | ICD-10-CM

## 2021-11-05 PROCEDURE — 99214 OFFICE O/P EST MOD 30 MIN: CPT | Performed by: ANESTHESIOLOGY

## 2021-11-08 ENCOUNTER — OFFICE VISIT (OUTPATIENT)
Dept: PHYSICAL THERAPY | Facility: CLINIC | Age: 56
End: 2021-11-08
Payer: COMMERCIAL

## 2021-11-08 DIAGNOSIS — M54.16 LUMBAR RADICULOPATHY: Primary | ICD-10-CM

## 2021-11-08 PROCEDURE — 97112 NEUROMUSCULAR REEDUCATION: CPT

## 2021-11-08 PROCEDURE — 97110 THERAPEUTIC EXERCISES: CPT

## 2021-11-10 ENCOUNTER — OFFICE VISIT (OUTPATIENT)
Dept: PHYSICAL THERAPY | Facility: CLINIC | Age: 56
End: 2021-11-10
Payer: COMMERCIAL

## 2021-11-10 DIAGNOSIS — M54.16 LUMBAR RADICULOPATHY: Primary | ICD-10-CM

## 2021-11-10 PROCEDURE — 97110 THERAPEUTIC EXERCISES: CPT | Performed by: PHYSICAL THERAPIST

## 2021-11-10 PROCEDURE — 97112 NEUROMUSCULAR REEDUCATION: CPT | Performed by: PHYSICAL THERAPIST

## 2021-11-11 ENCOUNTER — HOSPITAL ENCOUNTER (OUTPATIENT)
Dept: MRI IMAGING | Facility: HOSPITAL | Age: 56
Discharge: HOME | End: 2021-11-11
Attending: ANESTHESIOLOGY
Payer: COMMERCIAL

## 2021-11-11 ENCOUNTER — TELEPHONE (OUTPATIENT)
Dept: PAIN MEDICINE | Facility: CLINIC | Age: 56
End: 2021-11-11

## 2021-11-11 ENCOUNTER — APPOINTMENT (OUTPATIENT)
Dept: PHYSICAL THERAPY | Facility: CLINIC | Age: 56
End: 2021-11-11
Payer: COMMERCIAL

## 2021-11-11 DIAGNOSIS — M54.16 LUMBAR RADICULOPATHY: ICD-10-CM

## 2021-11-11 PROCEDURE — G1004 CDSM NDSC: HCPCS

## 2021-11-11 PROCEDURE — 72148 MRI LUMBAR SPINE W/O DYE: CPT

## 2021-11-15 ENCOUNTER — OFFICE VISIT (OUTPATIENT)
Dept: PHYSICAL THERAPY | Facility: CLINIC | Age: 56
End: 2021-11-15
Payer: COMMERCIAL

## 2021-11-15 DIAGNOSIS — M54.16 LUMBAR RADICULOPATHY: Primary | ICD-10-CM

## 2021-11-15 PROCEDURE — 97110 THERAPEUTIC EXERCISES: CPT

## 2021-11-15 PROCEDURE — 97112 NEUROMUSCULAR REEDUCATION: CPT

## 2021-11-18 ENCOUNTER — APPOINTMENT (OUTPATIENT)
Dept: PHYSICAL THERAPY | Facility: CLINIC | Age: 56
End: 2021-11-18
Payer: COMMERCIAL

## 2022-12-15 ENCOUNTER — APPOINTMENT (OUTPATIENT)
Dept: RADIOLOGY | Facility: CLINIC | Age: 57
End: 2022-12-15

## 2022-12-15 ENCOUNTER — OFFICE VISIT (OUTPATIENT)
Dept: URGENT CARE | Facility: CLINIC | Age: 57
End: 2022-12-15

## 2022-12-15 VITALS
OXYGEN SATURATION: 96 % | BODY MASS INDEX: 27.28 KG/M2 | TEMPERATURE: 98.7 F | DIASTOLIC BLOOD PRESSURE: 83 MMHG | WEIGHT: 180 LBS | HEIGHT: 68 IN | RESPIRATION RATE: 18 BRPM | SYSTOLIC BLOOD PRESSURE: 120 MMHG | HEART RATE: 107 BPM

## 2022-12-15 DIAGNOSIS — R68.89 FLU-LIKE SYMPTOMS: Primary | ICD-10-CM

## 2022-12-15 DIAGNOSIS — R68.89 FLU-LIKE SYMPTOMS: ICD-10-CM

## 2022-12-15 LAB
SARS-COV-2 AG UPPER RESP QL IA: NEGATIVE
VALID CONTROL: NORMAL

## 2022-12-15 RX ORDER — BENZONATATE 200 MG/1
200 CAPSULE ORAL 3 TIMES DAILY PRN
Qty: 20 CAPSULE | Refills: 0 | Status: SHIPPED | OUTPATIENT
Start: 2022-12-15 | End: 2022-12-29

## 2022-12-15 RX ORDER — ALBUTEROL SULFATE 90 UG/1
2 AEROSOL, METERED RESPIRATORY (INHALATION) EVERY 4 HOURS PRN
Qty: 18 G | Refills: 0 | Status: SHIPPED | OUTPATIENT
Start: 2022-12-15

## 2022-12-15 RX ORDER — ALBUTEROL SULFATE 90 UG/1
AEROSOL, METERED RESPIRATORY (INHALATION)
COMMUNITY
Start: 2022-07-20 | End: 2022-12-15

## 2022-12-16 NOTE — PROGRESS NOTES
[facility] [employer] [facility]        NAME: Kizzy Ramos is a 61 y o  female  : 1965    MRN: [id number]  DATE: December 15, 2022  TIME: 7:52 PM    Assessment and Plan   Flu-like symptoms [R68 89]  1  Flu-like symptoms  Poct Covid 19 Rapid Antigen Test    benzonatate (TESSALON) 200 MG capsule    albuterol (Proventil HFA) 90 mcg/act inhaler    XR chest pa & lateral            Patient Instructions   Drink plenty of fluids  May use over the counter cold medications for symptomatic treatment  Do not use medications with Pseudoephedrine or Phenylphrine if you have high blood pressure because it may worsen your blood pressure  Follow up with your PCP in 3-5 days if your symptoms do not improve or if you have any concerns  Go to the ER if symptoms become severe  Follow up with PCP in 3-5 days  Proceed to  ER if symptoms worsen  Chief Complaint     Chief Complaint   Patient presents with   • Cold Like Symptoms     Symptoms started yesterday, fatigue, cough (dry cough), headache, sinus congestion, fever(99 9-100 0)         History of Present Illness       Pt is a 61year old f with no sig PMHx presents to the office c/o fatigue, congestion, rhinorrhea, cough, chest tightness and chest congestion since last night  She denies SOB, CP, nausea, vomiting, abdominal pain or rashes  Review of Systems   Review of Systems   Constitutional: Positive for appetite change, chills, fatigue and fever  HENT: Positive for congestion, postnasal drip and rhinorrhea  Negative for sore throat  Respiratory: Positive for cough  Negative for shortness of breath  Cardiovascular: Negative for chest pain and palpitations  Gastrointestinal: Negative for abdominal pain, diarrhea, nausea and vomiting  Musculoskeletal: Positive for myalgias  Neurological: Positive for headaches  Negative for dizziness and light-headedness           Current Medications       Current Outpatient Medications:   •  albuterol (Proventil HFA) 90 mcg/act inhaler, Inhale 2 puffs every 4 (four) hours as needed for wheezing or shortness of breath, Disp: 18 g, Rfl: 0  •  benzonatate (TESSALON) 200 MG capsule, Take 1 capsule (200 mg total) by mouth 3 (three) times a day as needed for cough for up to 14 days, Disp: 20 capsule, Rfl: 0  •  citalopram (CeleXA) 20 mg tablet, Take 20 mg by mouth daily, Disp: , Rfl:     Current Allergies     Allergies as of 12/15/2022 - Reviewed 12/15/2022   Allergen Reaction Noted   • Food Angioedema 10/19/2021   • Treenut [nuts - food allergy] Anaphylaxis 09/22/2021   • Penicillins Other (See Comments) 02/06/2020            The following portions of the patient's history were reviewed and updated as appropriate: allergies, current medications, past family history, past medical history, past social history, past surgical history and problem list      Past Medical History:   Diagnosis Date   • Anxiety    • Depression    • Lumbar herniated disc        Past Surgical History:   Procedure Laterality Date   • HERNIA REPAIR     • HYSTERECTOMY     • KNEE SURGERY         Family History   Problem Relation Age of Onset   • Stroke Mother    • Hypertension Mother    • Cancer Father    • Hypertension Father    • No Known Problems Sister    • Diabetes Maternal Grandfather          Medications have been verified  Objective   /83   Pulse (!) 107   Temp 98 7 °F (37 1 °C)   Resp 18   Ht 5' 8" (1 727 m)   Wt 81 6 kg (180 lb)   SpO2 96%   BMI 27 37 kg/m²   No LMP recorded  Patient has had a hysterectomy  Physical Exam     Physical Exam  Vitals and nursing note reviewed  Constitutional:       General: She is not in acute distress  Appearance: Normal appearance  She is well-developed  She is ill-appearing  HENT:      Head: Normocephalic and atraumatic        Right Ear: Tympanic membrane, ear canal and external ear normal       Left Ear: Tympanic membrane, ear canal and external ear normal       Nose: Congestion and rhinorrhea present  Mouth/Throat:      Pharynx: Uvula midline  Eyes:      General: Lids are normal       Conjunctiva/sclera: Conjunctivae normal       Pupils: Pupils are equal, round, and reactive to light  Cardiovascular:      Rate and Rhythm: Regular rhythm  Tachycardia present  Pulses: Normal pulses  Heart sounds: Normal heart sounds  No murmur heard  No friction rub  No gallop  Pulmonary:      Effort: Pulmonary effort is normal       Breath sounds: Decreased breath sounds present  No wheezing, rhonchi or rales  Musculoskeletal:         General: Normal range of motion  Cervical back: Neck supple  Lymphadenopathy:      Cervical: No cervical adenopathy  Skin:     General: Skin is warm and dry  Capillary Refill: Capillary refill takes less than 2 seconds  Neurological:      Mental Status: She is alert         POC rapid COVID-19 negative    Chest XR: No evidence of cardiopulmonary etiology

## 2023-02-02 ENCOUNTER — DOCTOR'S OFFICE (OUTPATIENT)
Dept: URBAN - NONMETROPOLITAN AREA CLINIC 1 | Facility: CLINIC | Age: 58
Setting detail: OPHTHALMOLOGY
End: 2023-02-02
Payer: COMMERCIAL

## 2023-02-02 DIAGNOSIS — H25.13: ICD-10-CM

## 2023-02-02 DIAGNOSIS — H43.813: ICD-10-CM

## 2023-02-02 DIAGNOSIS — H04.123: ICD-10-CM

## 2023-02-02 DIAGNOSIS — H53.123: ICD-10-CM

## 2023-02-02 PROCEDURE — 92134 CPTRZ OPH DX IMG PST SGM RTA: CPT | Performed by: OPHTHALMOLOGY

## 2023-02-02 PROCEDURE — 99214 OFFICE O/P EST MOD 30 MIN: CPT | Performed by: OPHTHALMOLOGY

## 2023-02-02 PROCEDURE — 92202 OPSCPY EXTND ON/MAC DRAW: CPT | Performed by: OPHTHALMOLOGY

## 2023-02-02 ASSESSMENT — REFRACTION_MANIFEST
OS_CYLINDER: -1.25
OD_ADD: +2.50
OS_VA1: 20/20-2
OS_AXIS: 085
OD_VA2: 20/20-1
OD_CYLINDER: -1.00
OS_SPHERE: -1.25
OS_ADD: +2.50
OS_VA2: 20/20-2
OD_SPHERE: PLANO
OD_VA1: 20/20-1
OD_AXIS: 090

## 2023-02-02 ASSESSMENT — DECREASING TEAR LAKE - SEVERITY SCORE
OD_DEC_TEARLAKE: 1+
OS_DEC_TEARLAKE: 1+

## 2023-02-02 ASSESSMENT — SPHEQUIV_DERIVED
OS_SPHEQUIV: -1.875
OD_SPHEQUIV: -0.625
OS_SPHEQUIV: -1.875

## 2023-02-02 ASSESSMENT — REFRACTION_AUTOREFRACTION
OS_SPHERE: -1.25
OD_SPHERE: -0.25
OS_CYLINDER: -1.25
OD_AXIS: 089
OS_AXIS: 086
OD_CYLINDER: -0.75

## 2023-02-02 ASSESSMENT — SUPERFICIAL PUNCTATE KERATITIS (SPK)
OS_SPK: 2+
OD_SPK: 2+

## 2023-02-02 ASSESSMENT — VISUAL ACUITY
OD_BCVA: 20/150
OS_BCVA: 20/50

## 2023-02-02 ASSESSMENT — LID EXAM ASSESSMENTS
OD_MEIBOMITIS: T
OS_COMMENTS: CONTACT DERMATITIS
OD_ANGULAR_BLEPHARITIS: T
OS_ANGULAR_BLEPHARITIS: T
OS_MEIBOMITIS: T
OS_BLEPHARITIS: T
OD_COMMENTS: CONTACT DERMATITIS
OD_BLEPHARITIS: T

## 2023-02-02 ASSESSMENT — CONFRONTATIONAL VISUAL FIELD TEST (CVF)
OS_FINDINGS: CONSTRICTION
OD_FINDINGS: FULL

## 2023-02-06 ENCOUNTER — DOCTOR'S OFFICE (OUTPATIENT)
Dept: URBAN - NONMETROPOLITAN AREA CLINIC 1 | Facility: CLINIC | Age: 58
Setting detail: OPHTHALMOLOGY
End: 2023-02-06
Payer: COMMERCIAL

## 2023-02-06 DIAGNOSIS — H53.123: ICD-10-CM

## 2023-02-06 PROCEDURE — 92083 EXTENDED VISUAL FIELD XM: CPT | Performed by: OPHTHALMOLOGY

## 2023-02-15 ENCOUNTER — OPTICAL OFFICE (OUTPATIENT)
Dept: URBAN - NONMETROPOLITAN AREA CLINIC 4 | Facility: CLINIC | Age: 58
Setting detail: OPHTHALMOLOGY
End: 2023-02-15
Payer: COMMERCIAL

## 2023-02-15 ENCOUNTER — DOCTOR'S OFFICE (OUTPATIENT)
Dept: URBAN - NONMETROPOLITAN AREA CLINIC 1 | Facility: CLINIC | Age: 58
Setting detail: OPHTHALMOLOGY
End: 2023-02-15
Payer: COMMERCIAL

## 2023-02-15 DIAGNOSIS — H52.223: ICD-10-CM

## 2023-02-15 DIAGNOSIS — H52.4: ICD-10-CM

## 2023-02-15 PROCEDURE — 92012 INTRM OPH EXAM EST PATIENT: CPT | Performed by: OPTOMETRIST

## 2023-02-15 PROCEDURE — V2781 PROGRESSIVE LENS PER LENS: HCPCS | Performed by: OPTOMETRIST

## 2023-02-15 PROCEDURE — V2020 VISION SVCS FRAMES PURCHASES: HCPCS | Performed by: OPTOMETRIST

## 2023-02-15 PROCEDURE — 92015 DETERMINE REFRACTIVE STATE: CPT | Performed by: OPTOMETRIST

## 2023-02-15 PROCEDURE — V2784 LENS POLYCARB OR EQUAL: HCPCS | Performed by: OPTOMETRIST

## 2023-02-15 PROCEDURE — V2203 LENS SPHCYL BIFOCAL 4.00D/.1: HCPCS | Performed by: OPTOMETRIST

## 2023-02-15 PROCEDURE — V2750 ANTI-REFLECTIVE COATING: HCPCS | Performed by: OPTOMETRIST

## 2023-02-15 ASSESSMENT — REFRACTION_MANIFEST
OS_VA2: 20/20-2
OD_VA1: 20/20-1
OD_CYLINDER: -1.50
OS_SPHERE: -0.50
OS_AXIS: 090
OD_ADD: +2.50
OD_AXIS: 080
OS_ADD: +2.50
OD_VA2: 20/20-1
OS_VA1: 20/20-2
OS_CYLINDER: -1.75
OD_SPHERE: PLANO

## 2023-02-15 ASSESSMENT — SPHEQUIV_DERIVED
OS_SPHEQUIV: -1.375
OS_SPHEQUIV: -1.125
OD_SPHEQUIV: -1.625

## 2023-02-15 ASSESSMENT — REFRACTION_AUTOREFRACTION
OD_CYLINDER: -1.75
OD_AXIS: 80
OD_SPHERE: -0.75
OS_SPHERE: -0.25
OS_AXIS: 81
OS_CYLINDER: -1.75

## 2023-02-15 ASSESSMENT — CONFRONTATIONAL VISUAL FIELD TEST (CVF)
OS_FINDINGS: FULL
OD_FINDINGS: FULL

## 2023-02-15 ASSESSMENT — LID EXAM ASSESSMENTS
OD_BLEPHARITIS: T
OS_BLEPHARITIS: T

## 2023-02-15 ASSESSMENT — DECREASING TEAR LAKE - SEVERITY SCORE
OS_DEC_TEARLAKE: 1+
OD_DEC_TEARLAKE: 1+

## 2023-02-15 ASSESSMENT — VISUAL ACUITY
OD_BCVA: 20/100-1
OS_BCVA: 20/40

## 2023-02-15 ASSESSMENT — SUPERFICIAL PUNCTATE KERATITIS (SPK)
OD_SPK: 2+
OS_SPK: 2+

## 2023-02-23 ENCOUNTER — DOCTOR'S OFFICE (OUTPATIENT)
Dept: URBAN - NONMETROPOLITAN AREA CLINIC 1 | Facility: CLINIC | Age: 58
Setting detail: OPHTHALMOLOGY
End: 2023-02-23
Payer: COMMERCIAL

## 2023-02-23 DIAGNOSIS — H53.123: ICD-10-CM

## 2023-02-23 DIAGNOSIS — H43.813: ICD-10-CM

## 2023-02-23 PROBLEM — H01.00 BLEPHARITIS; RIGHT UPPER LID, RIGHT LOWER LID, LEFT UPPER LID, LEFT LOWER LID: Status: ACTIVE | Noted: 2023-02-15

## 2023-02-23 PROCEDURE — 99212 OFFICE O/P EST SF 10 MIN: CPT | Performed by: OPHTHALMOLOGY

## 2023-02-23 ASSESSMENT — DECREASING TEAR LAKE - SEVERITY SCORE
OS_DEC_TEARLAKE: 1+
OD_DEC_TEARLAKE: 1+

## 2023-02-23 ASSESSMENT — REFRACTION_MANIFEST
OD_VA2: 20/20-1
OS_ADD: +2.50
OD_CYLINDER: -1.50
OS_VA2: 20/20-2
OD_ADD: +2.50
OD_AXIS: 080
OD_VA1: 20/20-1
OS_VA1: 20/20-2
OS_SPHERE: -0.50
OS_CYLINDER: -1.75
OD_SPHERE: PLANO
OS_AXIS: 090

## 2023-02-23 ASSESSMENT — SPHEQUIV_DERIVED
OS_SPHEQUIV: -1.375
OD_SPHEQUIV: -1.625
OS_SPHEQUIV: -1.125

## 2023-02-23 ASSESSMENT — CONFRONTATIONAL VISUAL FIELD TEST (CVF)
OD_FINDINGS: FULL
OS_FINDINGS: FULL

## 2023-02-23 ASSESSMENT — REFRACTION_AUTOREFRACTION
OD_AXIS: 80
OS_SPHERE: -0.25
OD_SPHERE: -0.75
OD_CYLINDER: -1.75
OS_AXIS: 81
OS_CYLINDER: -1.75

## 2023-02-23 ASSESSMENT — VISUAL ACUITY
OS_BCVA: 20/40
OD_BCVA: 20/70

## 2023-02-23 ASSESSMENT — LID EXAM ASSESSMENTS
OD_BLEPHARITIS: T
OS_BLEPHARITIS: T

## 2023-02-23 ASSESSMENT — SUPERFICIAL PUNCTATE KERATITIS (SPK)
OD_SPK: 2+
OS_SPK: 2+

## 2023-03-30 ENCOUNTER — DOCTOR'S OFFICE (OUTPATIENT)
Dept: URBAN - NONMETROPOLITAN AREA CLINIC 1 | Facility: CLINIC | Age: 58
Setting detail: OPHTHALMOLOGY
End: 2023-03-30
Payer: COMMERCIAL

## 2023-03-30 DIAGNOSIS — H25.13: ICD-10-CM

## 2023-03-30 DIAGNOSIS — H43.813: ICD-10-CM

## 2023-03-30 DIAGNOSIS — H04.123: ICD-10-CM

## 2023-03-30 PROCEDURE — 92014 COMPRE OPH EXAM EST PT 1/>: CPT | Performed by: OPHTHALMOLOGY

## 2023-03-30 PROCEDURE — 92134 CPTRZ OPH DX IMG PST SGM RTA: CPT | Performed by: OPHTHALMOLOGY

## 2023-03-30 ASSESSMENT — SPHEQUIV_DERIVED
OS_SPHEQUIV: -1.125
OS_SPHEQUIV: -1.375
OD_SPHEQUIV: -1.625

## 2023-03-30 ASSESSMENT — REFRACTION_MANIFEST
OS_VA1: 20/20-2
OS_AXIS: 090
OD_CYLINDER: -1.50
OS_SPHERE: -0.50
OD_AXIS: 080
OS_ADD: +2.50
OD_ADD: +2.50
OD_VA2: 20/20-1
OD_VA1: 20/20-1
OS_VA2: 20/20-2
OS_CYLINDER: -1.75
OD_SPHERE: PLANO

## 2023-03-30 ASSESSMENT — VISUAL ACUITY
OS_BCVA: 20/30-1
OD_BCVA: 20/30-1

## 2023-03-30 ASSESSMENT — LID EXAM ASSESSMENTS
OS_BLEPHARITIS: T
OD_BLEPHARITIS: T

## 2023-03-30 ASSESSMENT — DECREASING TEAR LAKE - SEVERITY SCORE
OS_DEC_TEARLAKE: 1+
OD_DEC_TEARLAKE: 1+

## 2023-03-30 ASSESSMENT — CONFRONTATIONAL VISUAL FIELD TEST (CVF)
OS_FINDINGS: FULL
OD_FINDINGS: FULL

## 2023-03-30 ASSESSMENT — REFRACTION_AUTOREFRACTION
OS_SPHERE: -0.25
OD_AXIS: 80
OD_CYLINDER: -1.75
OD_SPHERE: -0.75
OS_AXIS: 81
OS_CYLINDER: -1.75

## 2023-03-30 ASSESSMENT — SUPERFICIAL PUNCTATE KERATITIS (SPK)
OS_SPK: 2+
OD_SPK: 2+

## 2023-04-13 ENCOUNTER — DOCTOR'S OFFICE (OUTPATIENT)
Dept: URBAN - NONMETROPOLITAN AREA CLINIC 1 | Facility: CLINIC | Age: 58
Setting detail: OPHTHALMOLOGY
End: 2023-04-13
Payer: COMMERCIAL

## 2023-04-13 DIAGNOSIS — H04.123: ICD-10-CM

## 2023-04-13 DIAGNOSIS — H53.123: ICD-10-CM

## 2023-04-13 DIAGNOSIS — H43.813: ICD-10-CM

## 2023-04-13 PROCEDURE — 92250 FUNDUS PHOTOGRAPHY W/I&R: CPT | Performed by: OPHTHALMOLOGY

## 2023-04-13 PROCEDURE — 99213 OFFICE O/P EST LOW 20 MIN: CPT | Performed by: OPHTHALMOLOGY

## 2023-04-13 PROCEDURE — 92235 FLUORESCEIN ANGRPH MLTIFRAME: CPT | Performed by: OPHTHALMOLOGY

## 2023-04-13 ASSESSMENT — CONFRONTATIONAL VISUAL FIELD TEST (CVF)
OD_FINDINGS: FULL
OS_FINDINGS: FULL

## 2023-04-13 ASSESSMENT — REFRACTION_MANIFEST
OD_AXIS: 080
OD_VA1: 20/20-1
OS_CYLINDER: -1.75
OD_SPHERE: PLANO
OD_ADD: +2.50
OS_ADD: +2.50
OD_CYLINDER: -1.50
OS_VA1: 20/20-2
OD_VA2: 20/20-1
OS_AXIS: 090
OS_SPHERE: -0.50
OS_VA2: 20/20-2

## 2023-04-13 ASSESSMENT — REFRACTION_AUTOREFRACTION
OS_AXIS: 81
OS_SPHERE: -0.25
OS_CYLINDER: -1.75
OD_CYLINDER: -1.75
OD_AXIS: 80
OD_SPHERE: -0.75

## 2023-04-13 ASSESSMENT — SUPERFICIAL PUNCTATE KERATITIS (SPK)
OD_SPK: 2+
OS_SPK: 2+

## 2023-04-13 ASSESSMENT — LID EXAM ASSESSMENTS
OS_BLEPHARITIS: T
OD_BLEPHARITIS: T

## 2023-04-13 ASSESSMENT — SPHEQUIV_DERIVED
OD_SPHEQUIV: -1.625
OS_SPHEQUIV: -1.125
OS_SPHEQUIV: -1.375

## 2023-04-13 ASSESSMENT — DECREASING TEAR LAKE - SEVERITY SCORE
OD_DEC_TEARLAKE: 1+
OS_DEC_TEARLAKE: 1+

## 2023-04-13 ASSESSMENT — VISUAL ACUITY
OD_BCVA: 20/25-1
OS_BCVA: 20/30-1

## 2023-04-27 ENCOUNTER — DOCTOR'S OFFICE (OUTPATIENT)
Dept: URBAN - NONMETROPOLITAN AREA CLINIC 1 | Facility: CLINIC | Age: 58
Setting detail: OPHTHALMOLOGY
End: 2023-04-27
Payer: COMMERCIAL

## 2023-04-27 DIAGNOSIS — H04.123: ICD-10-CM

## 2023-04-27 DIAGNOSIS — H53.2: ICD-10-CM

## 2023-04-27 DIAGNOSIS — H25.13: ICD-10-CM

## 2023-04-27 DIAGNOSIS — H43.813: ICD-10-CM

## 2023-04-27 PROCEDURE — 92134 CPTRZ OPH DX IMG PST SGM RTA: CPT | Performed by: OPHTHALMOLOGY

## 2023-04-27 PROCEDURE — 99214 OFFICE O/P EST MOD 30 MIN: CPT | Performed by: OPHTHALMOLOGY

## 2023-04-27 ASSESSMENT — REFRACTION_CURRENTRX
OS_VPRISM_DIRECTION: PROGS
OS_OVR_VA: 20/
OD_ADD2: +2.50
OD_AXIS: 77
OS_CYLINDER: -1.75
OS_ADD2: +2.50
OD_OVR_VA: 20/
OD_SPHERE: +0.25
OS_SPHERE: -0.25
OD_CYLINDER: -1.50
OS_AXIS: 95
OD_VPRISM_DIRECTION: PROGS

## 2023-04-27 ASSESSMENT — REFRACTION_AUTOREFRACTION
OS_AXIS: 89
OS_CYLINDER: -2.00
OS_SPHERE: -0.25
OD_CYLINDER: -1.50
OD_SPHERE: -0.75
OD_AXIS: 92

## 2023-04-27 ASSESSMENT — CONFRONTATIONAL VISUAL FIELD TEST (CVF)
OD_FINDINGS: FULL
OS_FINDINGS: FULL

## 2023-04-27 ASSESSMENT — VISUAL ACUITY
OD_BCVA: 20/30-1
OS_BCVA: 20/25-1

## 2023-04-27 ASSESSMENT — DECREASING TEAR LAKE - SEVERITY SCORE
OS_DEC_TEARLAKE: 1+
OD_DEC_TEARLAKE: 1+

## 2023-04-27 ASSESSMENT — REFRACTION_MANIFEST
OD_AXIS: 080
OS_ADD: +2.50
OD_VA1: 20/20-1
OS_AXIS: 090
OD_VA2: 20/20-1
OD_SPHERE: PLANO
OS_CYLINDER: -1.75
OD_CYLINDER: -1.50
OS_SPHERE: -0.50
OS_VA2: 20/20-2
OS_VA1: 20/20-2
OD_ADD: +2.50

## 2023-04-27 ASSESSMENT — SPHEQUIV_DERIVED
OS_SPHEQUIV: -1.375
OD_SPHEQUIV: -1.5
OS_SPHEQUIV: -1.25

## 2023-04-27 ASSESSMENT — LID EXAM ASSESSMENTS
OD_BLEPHARITIS: T
OS_BLEPHARITIS: T

## 2023-04-27 ASSESSMENT — SUPERFICIAL PUNCTATE KERATITIS (SPK)
OD_SPK: 2+
OS_SPK: 2+

## 2023-05-03 ENCOUNTER — DOCTOR'S OFFICE (OUTPATIENT)
Dept: URBAN - NONMETROPOLITAN AREA CLINIC 1 | Facility: CLINIC | Age: 58
Setting detail: OPHTHALMOLOGY
End: 2023-05-03
Payer: COMMERCIAL

## 2023-05-03 DIAGNOSIS — H53.2: ICD-10-CM

## 2023-05-03 PROBLEM — H43.313: Status: ACTIVE | Noted: 2023-04-27

## 2023-05-03 PROCEDURE — 92014 COMPRE OPH EXAM EST PT 1/>: CPT | Performed by: OPHTHALMOLOGY

## 2023-05-03 PROCEDURE — 92060 SENSORIMOTOR EXAMINATION: CPT | Performed by: OPHTHALMOLOGY

## 2023-05-03 ASSESSMENT — VISUAL ACUITY
OD_BCVA: 20/20-2
OS_BCVA: 20/25-1

## 2023-05-03 ASSESSMENT — REFRACTION_MANIFEST
OS_ADD: +2.50
OS_SPHERE: -0.50
OD_CYLINDER: -1.50
OS_VA2: 20/20-2
OD_SPHERE: PLANO
OD_ADD: +2.50
OD_AXIS: 080
OD_VA2: 20/20-1
OS_AXIS: 090
OD_VA1: 20/20-1
OS_VA1: 20/20-2
OS_CYLINDER: -1.75

## 2023-05-03 ASSESSMENT — CONFRONTATIONAL VISUAL FIELD TEST (CVF)
OD_FINDINGS: FULL
OS_FINDINGS: FULL

## 2023-05-03 ASSESSMENT — REFRACTION_CURRENTRX
OS_CYLINDER: -1.75
OS_ADD2: +2.50
OD_CYLINDER: -1.50
OD_SPHERE: +0.25
OS_OVR_VA: 20/
OD_ADD2: +2.50
OD_VPRISM_DIRECTION: PROGS
OD_AXIS: 77
OS_AXIS: 95
OD_OVR_VA: 20/
OS_SPHERE: -0.25
OS_VPRISM_DIRECTION: PROGS

## 2023-05-03 ASSESSMENT — SPHEQUIV_DERIVED
OS_SPHEQUIV: -1
OS_SPHEQUIV: -1.375
OD_SPHEQUIV: 0

## 2023-05-03 ASSESSMENT — LID EXAM ASSESSMENTS
OD_BLEPHARITIS: T
OS_BLEPHARITIS: T

## 2023-05-03 ASSESSMENT — REFRACTION_AUTOREFRACTION
OS_SPHERE: -0.25
OD_SPHERE: +0.75
OS_CYLINDER: -1.50
OD_AXIS: 080
OD_CYLINDER: -1.50
OS_AXIS: 081

## 2023-05-03 ASSESSMENT — SUPERFICIAL PUNCTATE KERATITIS (SPK)
OD_SPK: 2+
OS_SPK: 2+

## 2023-05-03 ASSESSMENT — DECREASING TEAR LAKE - SEVERITY SCORE
OS_DEC_TEARLAKE: 1+
OD_DEC_TEARLAKE: 1+

## 2023-05-17 ENCOUNTER — DOCTOR'S OFFICE (OUTPATIENT)
Dept: URBAN - NONMETROPOLITAN AREA CLINIC 1 | Facility: CLINIC | Age: 58
Setting detail: OPHTHALMOLOGY
End: 2023-05-17

## 2023-05-17 DIAGNOSIS — H52.223: ICD-10-CM

## 2023-05-17 DIAGNOSIS — H52.4: ICD-10-CM

## 2023-05-17 PROCEDURE — NCRX N/C GLASSES RX: Performed by: OPTOMETRIST

## 2023-05-17 ASSESSMENT — REFRACTION_MANIFEST
OD_VA1: 20/20-1
OS_AXIS: 090
OD_AXIS: 080
OD_ADD: +2.50
OS_VA2: 20/20-2
OS_ADD: +2.50
OS_CYLINDER: -1.75
OS_SPHERE: -0.50
OD_VA2: 20/20-1
OS_VA1: 20/20-2
OD_CYLINDER: -1.50
OD_SPHERE: PLANO

## 2023-05-17 ASSESSMENT — REFRACTION_CURRENTRX
OD_SPHERE: +0.75
OS_AXIS: 091
OD_AXIS: 081
OD_CYLINDER: -1.75
OD_ADD2: +2.50
OS_CYLINDER: -1.75
OD_OVR_VA: 20/
OS_SPHERE: -0.25
OS_VPRISM_DIRECTION: PROGS
OS_OVR_VA: 20/
OD_VPRISM_DIRECTION: PROGS
OS_ADD2: +2.50

## 2023-05-17 ASSESSMENT — VISUAL ACUITY
OS_BCVA: 20/25-2
OD_BCVA: 20/25

## 2023-05-17 ASSESSMENT — SUPERFICIAL PUNCTATE KERATITIS (SPK)
OD_SPK: 2+
OS_SPK: 2+

## 2023-05-17 ASSESSMENT — REFRACTION_AUTOREFRACTION
OD_SPHERE: 0.00
OD_CYLINDER: -1.75
OS_SPHERE: -1.00
OD_AXIS: 077
OS_AXIS: 079
OS_CYLINDER: -1.75

## 2023-05-17 ASSESSMENT — SPHEQUIV_DERIVED
OS_SPHEQUIV: -1.375
OD_SPHEQUIV: -0.875
OS_SPHEQUIV: -1.875

## 2023-05-17 ASSESSMENT — LID EXAM ASSESSMENTS
OD_BLEPHARITIS: T
OS_BLEPHARITIS: T

## 2023-05-17 ASSESSMENT — CONFRONTATIONAL VISUAL FIELD TEST (CVF)
OD_FINDINGS: FULL
OS_FINDINGS: FULL

## 2023-05-17 ASSESSMENT — DECREASING TEAR LAKE - SEVERITY SCORE
OD_DEC_TEARLAKE: 1+
OS_DEC_TEARLAKE: 1+

## 2023-06-23 DIAGNOSIS — J01.01 ACUTE RECURRENT MAXILLARY SINUSITIS: ICD-10-CM

## 2023-07-12 ENCOUNTER — HOSPITAL ENCOUNTER (OUTPATIENT)
Dept: CT IMAGING | Facility: HOSPITAL | Age: 58
Discharge: HOME | End: 2023-07-12
Payer: COMMERCIAL

## 2023-07-12 DIAGNOSIS — J01.01 ACUTE RECURRENT MAXILLARY SINUSITIS: ICD-10-CM

## 2023-07-12 PROCEDURE — 70486 CT MAXILLOFACIAL W/O DYE: CPT

## 2023-08-29 ENCOUNTER — OFFICE VISIT (OUTPATIENT)
Dept: DENTISTRY | Facility: CLINIC | Age: 58
End: 2023-08-29
Payer: COMMERCIAL

## 2023-08-29 VITALS — SYSTOLIC BLOOD PRESSURE: 131 MMHG | DIASTOLIC BLOOD PRESSURE: 87 MMHG | HEART RATE: 82 BPM

## 2023-08-29 DIAGNOSIS — K02.9 DENTAL CARIES: ICD-10-CM

## 2023-08-29 DIAGNOSIS — K05.6 PERIODONTAL DISEASE: Primary | ICD-10-CM

## 2023-08-29 PROBLEM — F41.1 GAD (GENERALIZED ANXIETY DISORDER): Status: ACTIVE | Noted: 2022-05-24

## 2023-08-29 PROBLEM — E03.8 SUBCLINICAL HYPOTHYROIDISM: Status: ACTIVE | Noted: 2022-05-24

## 2023-08-29 PROCEDURE — D0210 INTRAORAL - COMPLETE SERIES OF RADIOGRAPHIC IMAGES: HCPCS | Performed by: STUDENT IN AN ORGANIZED HEALTH CARE EDUCATION/TRAINING PROGRAM

## 2023-08-29 PROCEDURE — D0140 LIMITED ORAL EVALUATION - PROBLEM FOCUSED: HCPCS | Performed by: STUDENT IN AN ORGANIZED HEALTH CARE EDUCATION/TRAINING PROGRAM

## 2023-08-29 RX ORDER — FLUTICASONE PROPIONATE 50 MCG
SPRAY, SUSPENSION (ML) NASAL
COMMUNITY
Start: 2023-06-23

## 2023-08-29 RX ORDER — DULOXETIN HYDROCHLORIDE 20 MG/1
CAPSULE, DELAYED RELEASE ORAL
COMMUNITY
Start: 2023-04-10

## 2023-08-29 NOTE — PROGRESS NOTES
Comprehensive Exam    Gregor Finch presents for a comprehensive exam. Verbal consent for treatment given in addition to the forms. Reviewed health history - Patient is ASA    Consents signed: Yes    Perio: Generalized  Pain Scale: 0  Caries Assessment: high  Radiographs: [address]    Oral Hygiene instruction reviewed and given. Hygiene recall visits recommended to the patient. Treatment Plan:  1. Infection control: ext hopeless teeth/retained roots  2. Periodontal therapy: 4 quads of SRP  3. Caries control: Resins/crowns      Pt is aware she has Periodontal disease, several of her teeth are mobile. #5 mobile and carious. Pt is aware she may end up losing many teeth due to her periodontal disease in the future. Pt advised to get SRPs to slow progression of bone loss. Tx plan is dynamic. Phase I is ext of all infected teeth, Phase II is SRP, reevaluate, put on recall. Phase III is restorative    Prognosis is Guarded/Poor  Referrals needed: Referred to [facility] and to [facility]  Next visit: Resins/reevaluate.

## 2023-09-18 RX ORDER — OMEGA-3S/DHA/EPA/FISH OIL/D3 300MG-1000
400 CAPSULE ORAL DAILY
COMMUNITY

## 2023-09-18 NOTE — PRE-PROCEDURE INSTRUCTIONS
Pre-Surgery Instructions:   Medication Instructions   • albuterol (Proventil HFA) 90 mcg/act inhaler Uses PRN- OK to take day of surgery   • cholecalciferol (VITAMIN D3) 400 units tablet Hold day of surgery. • DULoxetine (CYMBALTA) 20 mg capsule Take day of surgery. • fluticasone (FLONASE) 50 mcg/act nasal spray Take night before surgery   • loratadine-pseudoephedrine (CLARITIN-D 24-HOUR)  mg per 24 hr tablet Hold day of surgery. Medication instructions for day surgery reviewed. Please use only a sip of water to take your instructed medications. Avoid all aspirin and over the counter vitamins, supplements and NSAIDS for one week prior to surgery per anesthesia guidelines. Tylenol is ok to take as needed. You will receive a call one business day prior to surgery with an arrival time and hospital directions. If your surgery is scheduled on a Monday, the hospital will be calling you on the Friday prior to your surgery. If you have not heard from anyone by 8pm, please call the hospital supervisor through the hospital  at [phone]. Do not eat or drink anything after midnight the night before your surgery, including candy, mints, lifesavers, or chewing gum. Do not drink alcohol 24hrs before your surgery. Try not to smoke at least 24hrs before your surgery. Follow the pre surgery showering instructions as listed in the [facility] Surgical Experience Booklet” or otherwise provided by your surgeon's office. Do not shave the surgical area 24 hours before surgery. Do not apply any lotions, creams, including makeup, cologne, deodorant, or perfumes after showering on the day of your surgery. No contact lenses, eye make-up, or artificial eyelashes. Remove nail polish, including gel polish, and any artificial, gel, or acrylic nails if possible. Remove all jewelry including rings and body piercing jewelry. Wear causal clothing that is easy to take on and off.  Consider your type of surgery. Keep any valuables, jewelry, piercings at home. Please bring any specially ordered equipment if indicated. Arrange for a responsible person to drive you to and from the hospital on the day of your surgery. Visitor Guidelines discussed. Call the surgeon's office with any new illnesses, exposures, or additional questions prior to surgery. Please reference your [facility] Surgical Experience Booklet” for additional information to prepare for your upcoming surgery.

## 2023-11-24 NOTE — PRE-PROCEDURE INSTRUCTIONS
Pre-Surgery Instructions:   Medication Instructions    albuterol (Proventil HFA) 90 mcg/act inhaler Uses PRN- OK to take day of surgery    cholecalciferol (VITAMIN D3) 400 units tablet Stop taking 7 days prior to surgery. DULoxetine (CYMBALTA) 20 mg capsule Take day of surgery. fluticasone (FLONASE) 50 mcg/act nasal spray Take night before surgery    loratadine-pseudoephedrine (CLARITIN-D 24-HOUR)  mg per 24 hr tablet Uses PRN- DO NOT take day of surgery    Medication instructions for day surgery reviewed. Please use only a sip of water to take your instructed medications. Avoid all over the counter vitamins, supplements and NSAIDS for one week prior to surgery per anesthesia guidelines. Tylenol is ok to take as needed. You will receive a call one business day prior to surgery with an arrival time and hospital directions. If your surgery is scheduled on a Monday, the hospital will be calling you on the Friday prior to your surgery. If you have not heard from anyone by 8pm, please call the hospital supervisor through the hospital  at [phone]. Geo Reese [phone]). Do not eat or drink anything after midnight the night before your surgery, including candy, mints, lifesavers, or chewing gum. Do not drink alcohol 24hrs before your surgery. Try not to smoke at least 24hrs before your surgery. Follow the pre surgery showering instructions as listed in the [facility] Surgical Experience Booklet” or otherwise provided by your surgeon's office. Do not use a blade to shave the surgical area 1 week before surgery. It is okay to use a clean electric clippers up to 24 hours before surgery. Do not apply any lotions, creams, including makeup, cologne, deodorant, or perfumes after showering on the day of your surgery. Do not use dry shampoo, hair spray, hair gel, or any type of hair products. No contact lenses, eye make-up, or artificial eyelashes.  Remove nail polish, including gel polish, and any artificial, gel, or acrylic nails if possible. Remove all jewelry including rings and body piercing jewelry. Wear causal clothing that is easy to take on and off. Consider your type of surgery. Keep any valuables, jewelry, piercings at home. Please bring any specially ordered equipment (sling, braces) if indicated. Arrange for a responsible person to drive you to and from the hospital on the day of your surgery. Visitor Guidelines discussed. Call the surgeon's office with any new illnesses, exposures, or additional questions prior to surgery. Please reference your [facility] Surgical Experience Booklet” for additional information to prepare for your upcoming surgery.

## 2023-11-28 ENCOUNTER — ANESTHESIA EVENT (OUTPATIENT)
Dept: PERIOP | Facility: HOSPITAL | Age: 58
End: 2023-11-28
Payer: COMMERCIAL

## 2023-11-28 NOTE — ANESTHESIA PREPROCEDURE EVALUATION
Procedure:  SEPTOPLASTY; BILATERAL INFERIOR TURBINATE SUBMUCOSAL REDUCTION (Nose)  BILATERAL SINUS ENDOSCOPY WITH BILATERAL ANTERIOR AND POSSIBLE TOTAL ETHMOIDECTOMIES; BILATERAL MIDDLE MEATAL ANTROSTOMIES; EXCISION OF RIGHT ANTROCHOANAL POLYP; POSSIBLE REYES MAURA (Nose)    Relevant Problems   ENDO   (+) Subclinical hypothyroidism      MUSCULOSKELETAL   (+) Chondromalacia patellae      NEURO/PSYCH   (+) MARLENI (generalized anxiety disorder)      Smoking    PONV      Physical Exam    Airway    Mallampati score: II  TM Distance: >3 FB  Neck ROM: full     Dental   No notable dental hx     Cardiovascular  Rhythm: regular, Rate: normal    Pulmonary   Breath sounds clear to auscultation    Other Findings  post-pubertal.      Anesthesia Plan  ASA Score- 2     Anesthesia Type- general with ASA Monitors. Additional Monitors:     Airway Plan: ETT. Plan Factors-Exercise tolerance (METS): >4 METS. Chart reviewed. EKG reviewed. Imaging results reviewed. Existing labs reviewed. Patient summary reviewed. Patient is a current smoker. Patient instructed to abstain from smoking on day of procedure. Patient did not smoke on day of surgery. Obstructive sleep apnea risk education given perioperatively. Induction- intravenous. Postoperative Plan- Plan for postoperative opioid use. Informed Consent- Anesthetic plan and risks discussed with patient. I personally reviewed this patient with the CRNA. Discussed and agreed on the Anesthesia Plan with the CRNA. Rasheed Speaker

## 2023-11-29 ENCOUNTER — ANESTHESIA (OUTPATIENT)
Dept: PERIOP | Facility: HOSPITAL | Age: 58
End: 2023-11-29
Payer: COMMERCIAL

## 2023-11-29 ENCOUNTER — HOSPITAL ENCOUNTER (OUTPATIENT)
Facility: HOSPITAL | Age: 58
Setting detail: OUTPATIENT SURGERY
Discharge: HOME | End: 2023-11-29
Attending: OTOLARYNGOLOGY | Admitting: OTOLARYNGOLOGY
Payer: COMMERCIAL

## 2023-11-29 VITALS
HEART RATE: 72 BPM | DIASTOLIC BLOOD PRESSURE: 74 MMHG | WEIGHT: 180 LBS | HEIGHT: 68 IN | OXYGEN SATURATION: 94 % | TEMPERATURE: 97.7 F | RESPIRATION RATE: 20 BRPM | SYSTOLIC BLOOD PRESSURE: 123 MMHG | BODY MASS INDEX: 27.28 KG/M2

## 2023-11-29 DIAGNOSIS — J34.2 DEVIATED NASAL SEPTUM: ICD-10-CM

## 2023-11-29 DIAGNOSIS — J01.01 ACUTE RECURRENT MAXILLARY SINUSITIS: ICD-10-CM

## 2023-11-29 PROCEDURE — 93005 ELECTROCARDIOGRAM TRACING: CPT

## 2023-11-29 PROCEDURE — 88300 SURGICAL PATH GROSS: CPT | Performed by: STUDENT IN AN ORGANIZED HEALTH CARE EDUCATION/TRAINING PROGRAM

## 2023-11-29 RX ORDER — ALBUTEROL SULFATE 2.5 MG/3ML
2.5 SOLUTION RESPIRATORY (INHALATION) ONCE
Status: COMPLETED | OUTPATIENT
Start: 2023-11-29 | End: 2023-11-29

## 2023-11-29 RX ORDER — SCOLOPAMINE TRANSDERMAL SYSTEM 1 MG/1
1 PATCH, EXTENDED RELEASE TRANSDERMAL
Status: DISCONTINUED | OUTPATIENT
Start: 2023-11-29 | End: 2023-11-29 | Stop reason: HOSPADM

## 2023-11-29 RX ORDER — MIDAZOLAM HYDROCHLORIDE 2 MG/2ML
INJECTION, SOLUTION INTRAMUSCULAR; INTRAVENOUS AS NEEDED
Status: DISCONTINUED | OUTPATIENT
Start: 2023-11-29 | End: 2023-11-29

## 2023-11-29 RX ORDER — SODIUM CHLORIDE, SODIUM LACTATE, POTASSIUM CHLORIDE, CALCIUM CHLORIDE 600; 310; 30; 20 MG/100ML; MG/100ML; MG/100ML; MG/100ML
INJECTION, SOLUTION INTRAVENOUS CONTINUOUS PRN
Status: DISCONTINUED | OUTPATIENT
Start: 2023-11-29 | End: 2023-11-29

## 2023-11-29 RX ORDER — GINSENG 100 MG
CAPSULE ORAL AS NEEDED
Status: DISCONTINUED | OUTPATIENT
Start: 2023-11-29 | End: 2023-11-29 | Stop reason: HOSPADM

## 2023-11-29 RX ORDER — OXYMETAZOLINE HYDROCHLORIDE 0.05 G/100ML
SPRAY NASAL AS NEEDED
Status: DISCONTINUED | OUTPATIENT
Start: 2023-11-29 | End: 2023-11-29 | Stop reason: HOSPADM

## 2023-11-29 RX ORDER — SODIUM CHLORIDE/ALOE VERA
GEL (GRAM) NASAL AS NEEDED
Status: DISCONTINUED | OUTPATIENT
Start: 2023-11-29 | End: 2023-11-29 | Stop reason: HOSPADM

## 2023-11-29 RX ORDER — FENTANYL CITRATE/PF 50 MCG/ML
50 SYRINGE (ML) INJECTION
Status: DISCONTINUED | OUTPATIENT
Start: 2023-11-29 | End: 2023-11-29 | Stop reason: HOSPADM

## 2023-11-29 RX ORDER — COCAINE HYDROCHLORIDE 40 MG/ML
SOLUTION NASAL AS NEEDED
Status: DISCONTINUED | OUTPATIENT
Start: 2023-11-29 | End: 2023-11-29 | Stop reason: HOSPADM

## 2023-11-29 RX ORDER — AZITHROMYCIN 250 MG/1
TABLET, FILM COATED ORAL
Qty: 6 TABLET | Refills: 0 | Status: SHIPPED | OUTPATIENT
Start: 2023-11-29 | End: 2023-12-03

## 2023-11-29 RX ORDER — SODIUM CHLORIDE, SODIUM LACTATE, POTASSIUM CHLORIDE, CALCIUM CHLORIDE 600; 310; 30; 20 MG/100ML; MG/100ML; MG/100ML; MG/100ML
100 INJECTION, SOLUTION INTRAVENOUS CONTINUOUS
Status: DISCONTINUED | OUTPATIENT
Start: 2023-11-29 | End: 2023-11-29 | Stop reason: HOSPADM

## 2023-11-29 RX ORDER — ACETAMINOPHEN 325 MG/1
975 TABLET ORAL ONCE
Status: COMPLETED | OUTPATIENT
Start: 2023-11-29 | End: 2023-11-29

## 2023-11-29 RX ORDER — ONDANSETRON 2 MG/ML
INJECTION INTRAMUSCULAR; INTRAVENOUS AS NEEDED
Status: DISCONTINUED | OUTPATIENT
Start: 2023-11-29 | End: 2023-11-29

## 2023-11-29 RX ORDER — PROPOFOL 10 MG/ML
INJECTION, EMULSION INTRAVENOUS AS NEEDED
Status: DISCONTINUED | OUTPATIENT
Start: 2023-11-29 | End: 2023-11-29

## 2023-11-29 RX ORDER — DEXAMETHASONE SODIUM PHOSPHATE 10 MG/ML
INJECTION, SOLUTION INTRAMUSCULAR; INTRAVENOUS AS NEEDED
Status: DISCONTINUED | OUTPATIENT
Start: 2023-11-29 | End: 2023-11-29

## 2023-11-29 RX ORDER — HYDROCODONE BITARTRATE AND ACETAMINOPHEN 5; 325 MG/1; MG/1
2 TABLET ORAL EVERY 6 HOURS PRN
Qty: 30 TABLET | Refills: 0 | Status: SHIPPED | OUTPATIENT
Start: 2023-11-29 | End: 2023-12-09

## 2023-11-29 RX ORDER — FENTANYL CITRATE 50 UG/ML
INJECTION, SOLUTION INTRAMUSCULAR; INTRAVENOUS AS NEEDED
Status: DISCONTINUED | OUTPATIENT
Start: 2023-11-29 | End: 2023-11-29

## 2023-11-29 RX ORDER — LIDOCAINE HYDROCHLORIDE 10 MG/ML
INJECTION, SOLUTION EPIDURAL; INFILTRATION; INTRACAUDAL; PERINEURAL AS NEEDED
Status: DISCONTINUED | OUTPATIENT
Start: 2023-11-29 | End: 2023-11-29

## 2023-11-29 RX ORDER — LIDOCAINE HYDROCHLORIDE AND EPINEPHRINE 10; 10 MG/ML; UG/ML
INJECTION, SOLUTION INFILTRATION; PERINEURAL AS NEEDED
Status: DISCONTINUED | OUTPATIENT
Start: 2023-11-29 | End: 2023-11-29 | Stop reason: HOSPADM

## 2023-11-29 RX ORDER — ROCURONIUM BROMIDE 10 MG/ML
INJECTION, SOLUTION INTRAVENOUS AS NEEDED
Status: DISCONTINUED | OUTPATIENT
Start: 2023-11-29 | End: 2023-11-29

## 2023-11-29 RX ADMIN — SUGAMMADEX 200 MG: 100 INJECTION, SOLUTION INTRAVENOUS at 15:53

## 2023-11-29 RX ADMIN — ROCURONIUM BROMIDE 40 MG: 10 INJECTION, SOLUTION INTRAVENOUS at 15:22

## 2023-11-29 RX ADMIN — SODIUM CHLORIDE, SODIUM LACTATE, POTASSIUM CHLORIDE, AND CALCIUM CHLORIDE: .6; .31; .03; .02 INJECTION, SOLUTION INTRAVENOUS at 15:13

## 2023-11-29 RX ADMIN — PROPOFOL 150 MCG/KG/MIN: 10 INJECTION, EMULSION INTRAVENOUS at 15:24

## 2023-11-29 RX ADMIN — ONDANSETRON 4 MG: 2 INJECTION INTRAMUSCULAR; INTRAVENOUS at 15:22

## 2023-11-29 RX ADMIN — SODIUM CHLORIDE 4 MCG: 9 INJECTION, SOLUTION INTRAVENOUS at 15:22

## 2023-11-29 RX ADMIN — MIDAZOLAM 2 MG: 1 INJECTION INTRAMUSCULAR; INTRAVENOUS at 15:13

## 2023-11-29 RX ADMIN — ACETAMINOPHEN 975 MG: 325 TABLET, FILM COATED ORAL at 13:34

## 2023-11-29 RX ADMIN — DEXAMETHASONE SODIUM PHOSPHATE 10 MG: 10 INJECTION, SOLUTION INTRAMUSCULAR; INTRAVENOUS at 15:22

## 2023-11-29 RX ADMIN — SCOPALAMINE 1 PATCH: 1 PATCH, EXTENDED RELEASE TRANSDERMAL at 13:35

## 2023-11-29 RX ADMIN — PROPOFOL 170 MG: 10 INJECTION, EMULSION INTRAVENOUS at 15:22

## 2023-11-29 RX ADMIN — ALBUTEROL SULFATE 2.5 MG: 2.5 SOLUTION RESPIRATORY (INHALATION) at 13:35

## 2023-11-29 RX ADMIN — LIDOCAINE HYDROCHLORIDE 50 MG: 10 INJECTION, SOLUTION EPIDURAL; INFILTRATION; INTRACAUDAL; PERINEURAL at 15:22

## 2023-11-29 RX ADMIN — SODIUM CHLORIDE, SODIUM LACTATE, POTASSIUM CHLORIDE, AND CALCIUM CHLORIDE: .6; .31; .03; .02 INJECTION, SOLUTION INTRAVENOUS at 15:17

## 2023-11-29 RX ADMIN — FENTANYL CITRATE 50 MCG: 50 INJECTION INTRAMUSCULAR; INTRAVENOUS at 15:22

## 2023-11-29 NOTE — DISCHARGE SUMMARY
Discharge Summary - Cristian El 60 y.o. female MRN: [id number]    Unit/Bed#: OR POOL Encounter: [id number]    Admission Date:     Admitting Diagnosis: Deviated nasal septum [J34.2]  Acute recurrent maxillary sinusitis [J01.01]    HPI: Status post septoplasty and bilateral inferior turbinate submucosal reduction    Procedures Performed: No orders of the defined types were placed in this encounter. Summary of Hospital Course: Unremarkable    Significant Findings, Care, Treatment and Services Provided: Surgery    Complications: None    Discharge Diagnosis: Septal deviation and bilateral inferior turbinate hypertrophy    Medical Problems       Resolved Problems  Date Reviewed: 11/29/2023   None         Condition at Discharge: good         Discharge instructions/Information to patient and family:   See after visit summary for information provided to patient and family. Provisions for Follow-Up Care:  See after visit summary for information related to follow-up care and any pertinent home health orders. PCP: Porsha Polk MD    Disposition: Home    Planned Readmission: No      Discharge Statement   I spent 15 minutes discharging the patient. This time was spent on the day of discharge. I had direct contact with the patient on the day of discharge. Additional documentation is required if more than 30 minutes were spent on discharge. Discharge Medications:  See after visit summary for reconciled discharge medications provided to patient and family.

## 2023-11-29 NOTE — DISCHARGE INSTR - AVS FIRST PAGE
Thao Perez   [address], [address] [facility], [address]   PHONE: [phone] FAX:  [phone]  EMAIL: [email or web address]  Kat Mcnair M.D.     DR. Billie Berman POST OP PATIENT INSTRUCTIONS FOLLOWING NASAL SURGERY       1. DISCOMFORT AND SWELLING    Your doctor has given you a pain medicine to use as directed. You may actually find that after the first two days following your surgery, Tylenol controls your discomfort well. Do not use aspirin, Advil or similar medicines for two weeks after your surgery. These medicines can cause bleeding and can ruin your surgical result. If you have questions about a medicine, ask your doctor. You may expect some swelling and discoloration around your eyes and nose following surgery. You may gently apply an ice pack to this area during the first 48 hours following surgery. 2. FEVER    Fever of up to 102 F following surgery is common. Please call the doctor should your fever run higher. 3. NASAL DISCHARGE OR BLEEDING  A small amount of bleeding or nasal discharge will occur from your nose during the first 48 hours following your operation. This will gradually go away over the course of the next several days. Change the gauze drip pad under your nose as necessary (usually several times a day at first). You may remove the gauze pad while dining to make it more comfortable for you to eat. 4. NASAL HEMORRHAGING  Heavy bleeding from the nose (hemorrhaging) is very rare and usually not serious. Should it occur, try to remain calm, keep your head elevated and apply ice compresses to the side of the bleeding. Place a towel under your nostrils to absorb the blood, but do not insert cotton or paper into your nose as this may damage the result of your surgery. Again, do not use aspirin-like compounds, since they can cause bleeding.  Any heavy bleeding that lasts for more than ten minutes should be reported immediately to your doctor, who may wish to see you emergently. 5. NASAL PACKING AND SPLINTING  Nasal surgery requires nasal packing. This may make  you uncomfortable, because you will not be able to breathe through your nose. You will need to breathe through your mouth, and your mouth may become dry and irritated. A bedside humidifier or vaporizer will help relieve this dryness and will promote nasal healing. While your nose is packed, you may experience headaches similar to sinus infections. Please take your pain medicine and especially your antibiotic as directed by your doctor. Failure to take your antibiotic can result in toxic shock syndrome, which is characterized by high fevers, rashes and weakness which can result in death. Packing is usually removed the next day. There will be some bleeding at the time of packing removal.  6. DIET    Try to take only liquids or soft foods during the first 24 hours after the surgery, and then increase your diet to normal as tolerated. Avoid very hot liquids and foods as these may cause bleeding. Do not be surprised if your meals are “tasteless” - remember, with your nose packed, you will not be able to smell, and your sense of smell contributes much to your sense of taste. You may also find that your upper lip and upper front teeth feel numb. This is due to a numbing of the nerves in the floor of your nose. This numbness is rarely permanent, but may take several weeks to go away. 7. NASAL CARE    Do not blow your nose in the first week following surgery - this may loosen crusts and cause bleeding. When you do begin blowing your nose, please blow both sides at the same time. You may find that using a nasal saline spray several times a day will help prevent crusts from forming in your nose. These items are available over the counter at your local drugstore. If you must sneeze, sneeze with your mouth open. This will relieve pressure on the nose.   8. ACTIVITY    Remember that the blood pressure in your nose increases whenever you lower your head below the level of your heart - this may lead to nasal bleeding. Therefore, try to sleep on two or three pillows, and be very careful about bending over in the first two weeks following surgery. You may slowly advance your physical activity, but should avoid any activities which might create excessive sweating and loosen your tapes. Do not strain at stool, lift heavy objects, etc. - this may cause nasal bleeding. If you are constipated, a stool softener or Milk of Magnesia may help. Exercise should be avoided for at least two weeks following surgery. In general, sports such as running, weight-lifting, tennis, golf and swimming may be resumed in approximately 2 weeks following surgery. If your external nose has been operated on, body contact sports such as volleyball, football, basketball and wrestling are prohibited for six weeks following surgery. When in doubt, please ask your doctor before engaging in any physical activity that could damage the result of your surgery.

## 2023-11-29 NOTE — OP NOTE
OPERATIVE REPORT  PATIENT NAME: Dea Mathew    :  1965  MRN: [id number]  Pt Location: [facility] OR ROOM 02    SURGERY DATE: 2023    Surgeon(s) and Role:     * Waqas Robbins MD - Primary    Preop Diagnosis:  Deviated nasal septum [J34.2]  Acute recurrent maxillary sinusitis [J01.01]    Post-Op Diagnosis Codes:     * Deviated nasal septum [J34.2]     * Acute recurrent maxillary sinusitis [J01.01]    Procedure(s):  SEPTOPLASTY; BILATERAL INFERIOR TURBINATE SUBMUCOSAL REDUCTION      Specimen(s):  * No specimens in log *    Estimated Blood Loss:   Minimal    Drains:  * No LDAs found *    Anesthesia Type:   General    Operative Indications:  Deviated nasal septum [J34.2]  Acute recurrent maxillary sinusitis [J01.01]      Operative Findings:  Deviated septum, inferior turbinate hypertrophy and chronic sinusitis    Complications:   None    Procedure and Technique:  The patient was identified and taken to the operative suite. A timeout was called. After the successful induction of general anesthesia and endotracheal intubation, the patient was prepped and draped in usual fashion. Afrin was sprayed into the nose bilaterally and 1% lidocaine, 1-100,000 epinephrine was injected into the septum and inferior turbinates. Cocaine pledgets were then placed. After waiting 2 minutes, a left hemitransfixion incision was made with a [place] blade and a mucoperichondrial flap was raised on the left side, using the Tom Green knife and iris scissors. An intercartilaginous incision was made, again with a Tom Green knife, and a mucoperichondrial flap was raised on the opposite side. The bony and cartilaginous defect was identified and removed with the Paez scissors and swivel knife and a portion of the cartilage was morselized and placed back into the pocket. Care was taken to preserve the dorsal and caudal struts.   The hemitransfixion was then closed with 4-0 chromic in an interrupted fashion and 4-0 plain gut through and through as a quilting stitch for the mucoperichondrium. Next, the inferior turbinates were outfractured with a Lemus bar and the Coblation was used bilaterally, developing 2 submucosal tunnels with the current on the setting of 6 to achieve submucosal reduction of the turbinates without bleeding. The lateral nasal wall was then examined bilaterally and, presumably, due to the fact that she is still smoking, there is significant edema, boggy in nature, obscuring the view and I made the decision, out of an abundance of caution, to terminate the procedure at that point. Renee splints were placed on either side of the septum and affixed with 2-0 silk suture. The throat was suctioned and an oral airway was placed. The patient was awakened and extubated without incident and taken to the PACU in excellent condition. Instrument and sponge counts were correct x2 at the end of the case. I was present for the entire procedure.     Patient Disposition:  PACU         SIGNATURE: Michelle Jj MD  DATE: November 29, 2023  TIME: 2:50 PM

## 2023-11-29 NOTE — INTERVAL H&P NOTE
H&P reviewed. After examining the patient I find no changes in the patients condition since the H&P had been written.     Vitals:    11/29/23 1246   Pulse: 80   Resp: 18   Temp: 97.8 °F (36.6 °C)   SpO2: 97%

## 2023-11-29 NOTE — PROGRESS NOTES
I had a preoperative discussion with the patient when I asked her if she had stopped smoking as requested and she stated that she had not completely stop smoking. I proceeded to tell her that the best course of action would be to postpone the surgery. She asked that we at least try to do the surgery including the septum and inferior turbinates and possibly endoscopic sinus surgery, understanding that postoperative bleeding and/or complication could be increased.   After an informed discussion about the added risks, the patient agrees to proceed with the surgery per her request.

## 2023-11-29 NOTE — ANESTHESIA POSTPROCEDURE EVALUATION
Post-Op Assessment Note    CV Status:  Stable    Pain management: satisfactory to patient       Mental Status:  Alert and awake   Hydration Status:  Stable   PONV Controlled:  None   Airway Patency:  Patent  Airway: intubated     Post Op Vitals Reviewed: Yes    No anethesia notable event occurred.     Staff: CRNA               BP   120/81   Temp   97.1   Pulse  81   Resp   14   SpO2   98

## 2023-11-30 LAB
ATRIAL RATE: 71 BPM
P AXIS: 88 DEGREES
PR INTERVAL: 168 MS
QRS AXIS: 91 DEGREES
QRSD INTERVAL: 78 MS
QT INTERVAL: 426 MS
QTC INTERVAL: 462 MS
T WAVE AXIS: 90 DEGREES
VENTRICULAR RATE: 71 BPM

## 2023-12-05 PROCEDURE — 88300 SURGICAL PATH GROSS: CPT | Performed by: STUDENT IN AN ORGANIZED HEALTH CARE EDUCATION/TRAINING PROGRAM

## 2023-12-27 ENCOUNTER — OFFICE VISIT (OUTPATIENT)
Dept: URGENT CARE | Facility: CLINIC | Age: 58
End: 2023-12-27
Payer: COMMERCIAL

## 2023-12-27 VITALS
SYSTOLIC BLOOD PRESSURE: 136 MMHG | RESPIRATION RATE: 18 BRPM | BODY MASS INDEX: 26.52 KG/M2 | HEART RATE: 72 BPM | TEMPERATURE: 97.1 F | OXYGEN SATURATION: 98 % | DIASTOLIC BLOOD PRESSURE: 70 MMHG | WEIGHT: 175 LBS | HEIGHT: 68 IN

## 2023-12-27 DIAGNOSIS — J01.00 ACUTE NON-RECURRENT MAXILLARY SINUSITIS: Primary | ICD-10-CM

## 2023-12-27 PROCEDURE — S9088 SERVICES PROVIDED IN URGENT: HCPCS | Performed by: PHYSICIAN ASSISTANT

## 2023-12-27 PROCEDURE — 99213 OFFICE O/P EST LOW 20 MIN: CPT | Performed by: PHYSICIAN ASSISTANT

## 2023-12-27 RX ORDER — METHYLPREDNISOLONE 4 MG/1
TABLET ORAL
Qty: 1 EACH | Refills: 0 | Status: SHIPPED | OUTPATIENT
Start: 2023-12-27

## 2023-12-27 RX ORDER — AZITHROMYCIN 250 MG/1
TABLET, FILM COATED ORAL
Qty: 6 TABLET | Refills: 0 | Status: SHIPPED | OUTPATIENT
Start: 2023-12-27 | End: 2023-12-31

## 2023-12-27 NOTE — PROGRESS NOTES
[facility]        NAME: Ruth Keyes is a 58 y.o. female  : 1965    MRN: [id number]  DATE: 2023  TIME: 11:40 AM    Assessment and Plan   Acute non-recurrent maxillary sinusitis [J01.00]  1. Acute non-recurrent maxillary sinusitis  azithromycin (ZITHROMAX) 250 mg tablet    methylPREDNISolone 4 MG tablet therapy pack            Patient Instructions   Drink plenty of fluids.  May use over the counter cold medications for symptomatic treatment. Do not use medications with Pseudoephedrine or Phenylphrine if you have high blood pressure because it may worsen your blood pressure. Follow up with your PCP in 3-5 days if your symptoms do not improve or if you have any concerns. Go to the ER if symptoms become severe.      Follow up with PCP in 3-5 days.  Proceed to  ER if symptoms worsen.    Chief Complaint     Chief Complaint   Patient presents with    Cold Like Symptoms     Starting 5 days ago right sided head pressure, ringing in ears, fatigue, no fevers or body aches.          History of Present Illness       Patient is a 58-year-old female severe past medical history of recent deviated septum and correction surgery 4 weeks ago presents the office planing of fatigue, body aches, right-sided maxillary sinus pain and pressure, and ringing in the ears for 1 week.        Review of Systems   Review of Systems   Constitutional:  Positive for fatigue. Negative for chills and fever.   HENT:  Positive for congestion, ear pain, postnasal drip, rhinorrhea and tinnitus. Negative for sore throat.    Respiratory:  Negative for cough and shortness of breath.    Cardiovascular:  Negative for chest pain and palpitations.   Gastrointestinal:  Negative for abdominal pain, diarrhea, nausea and vomiting.   Musculoskeletal:  Positive for myalgias.   Skin:  Negative for rash.   Neurological:  Positive for headaches. Negative for dizziness and light-headedness.         Current Medications       Current  Outpatient Medications:     albuterol (Proventil HFA) 90 mcg/act inhaler, Inhale 2 puffs every 4 (four) hours as needed for wheezing or shortness of breath, Disp: 18 g, Rfl: 0    azithromycin (ZITHROMAX) 250 mg tablet, Take 2 tablets today then 1 tablet daily x 4 days, Disp: 6 tablet, Rfl: 0    cholecalciferol (VITAMIN D3) 400 units tablet, Take 400 Units by mouth daily, Disp: , Rfl:     DULoxetine (CYMBALTA) 20 mg capsule, TAKE 1 CAPSULE BY MOUTH IN THE MORNING. DO NOT CUT, CRUSH OR CHEW., Disp: , Rfl:     methylPREDNISolone 4 MG tablet therapy pack, Use as directed on package, Disp: 1 each, Rfl: 0    fluticasone (FLONASE) 50 mcg/act nasal spray, ADMINISTER 2 SPRAYS INTO NOSTRIL AT BEDTIME. (Patient not taking: Reported on 12/27/2023), Disp: , Rfl:     loratadine-pseudoephedrine (CLARITIN-D 24-HOUR)  mg per 24 hr tablet, Take 1 tablet by mouth as needed (Patient not taking: Reported on 12/27/2023), Disp: , Rfl:     Current Allergies     Allergies as of 12/27/2023 - Reviewed 12/27/2023   Allergen Reaction Noted    Food Angioedema 10/19/2021    Penicillins Other (See Comments) 02/06/2020    Treenut [nuts - food allergy] Anaphylaxis 09/22/2021            The following portions of the patient's history were reviewed and updated as appropriate: allergies, current medications, past family history, past medical history, past social history, past surgical history and problem list.     Past Medical History:   Diagnosis Date    Anxiety     Depression     Deviated septum     Glaucoma     Lumbar herniated disc     Motion sickness     PONV (postoperative nausea and vomiting)     Seasonal allergies     Teeth missing     Tooth loose     upper right    Wears glasses        Past Surgical History:   Procedure Laterality Date    FOOT SURGERY      HERNIA REPAIR      HYSTERECTOMY      KNEE SURGERY      arth       Family History   Problem Relation Age of Onset    Stroke Mother     Hypertension Mother     Cancer Father      "Hypertension Father     No Known Problems Sister     Diabetes Maternal Grandfather          Medications have been verified.        Objective   /70   Pulse 72   Temp (!) 97.1 °F (36.2 °C)   Resp 18   Ht 5' 8\" (1.727 m)   Wt 79.4 kg (175 lb)   SpO2 98%   BMI 26.61 kg/m²   No LMP recorded. Patient has had a hysterectomy.       Physical Exam     Physical Exam  Vitals and nursing note reviewed.   Constitutional:       Appearance: Normal appearance. She is well-developed.   HENT:      Head: Normocephalic and atraumatic.      Right Ear: Tympanic membrane, ear canal and external ear normal.      Left Ear: Tympanic membrane, ear canal and external ear normal.      Nose: Congestion and rhinorrhea present.      Right Sinus: Maxillary sinus tenderness present.      Mouth/Throat:      Mouth: Mucous membranes are moist.      Pharynx: Oropharynx is clear. Uvula midline.   Eyes:      General: Lids are normal.      Conjunctiva/sclera: Conjunctivae normal.      Pupils: Pupils are equal, round, and reactive to light.   Cardiovascular:      Rate and Rhythm: Normal rate and regular rhythm.      Pulses: Normal pulses.      Heart sounds: Normal heart sounds. No murmur heard.     No friction rub. No gallop.   Pulmonary:      Effort: Pulmonary effort is normal.      Breath sounds: Normal breath sounds. No wheezing, rhonchi or rales.   Musculoskeletal:         General: Normal range of motion.      Cervical back: Neck supple.   Lymphadenopathy:      Cervical: No cervical adenopathy.   Skin:     General: Skin is warm and dry.      Capillary Refill: Capillary refill takes less than 2 seconds.   Neurological:      Mental Status: She is alert.                   "

## 2024-06-14 ENCOUNTER — OFFICE VISIT (OUTPATIENT)
Dept: DENTISTRY | Facility: CLINIC | Age: 59
End: 2024-06-14
Payer: COMMERCIAL

## 2024-06-14 VITALS — HEART RATE: 77 BPM | SYSTOLIC BLOOD PRESSURE: 131 MMHG | DIASTOLIC BLOOD PRESSURE: 81 MMHG

## 2024-06-14 DIAGNOSIS — K02.9 TOOTH DECAY: ICD-10-CM

## 2024-06-14 DIAGNOSIS — K05.6 PERIODONTAL DISEASE: Primary | ICD-10-CM

## 2024-06-14 PROCEDURE — D2393 RESIN-BASED COMPOSITE - 3 SURFACES, POSTERIOR: HCPCS | Performed by: STUDENT IN AN ORGANIZED HEALTH CARE EDUCATION/TRAINING PROGRAM

## 2024-06-14 PROCEDURE — D0274 BITEWINGS - 4 RADIOGRAPHIC IMAGES: HCPCS | Performed by: STUDENT IN AN ORGANIZED HEALTH CARE EDUCATION/TRAINING PROGRAM

## 2024-06-14 PROCEDURE — D0120 PERIODIC ORAL EVALUATION - ESTABLISHED PATIENT: HCPCS | Performed by: STUDENT IN AN ORGANIZED HEALTH CARE EDUCATION/TRAINING PROGRAM

## 2024-06-14 RX ORDER — LEVOTHYROXINE SODIUM 0.05 MG/1
50 TABLET ORAL DAILY
COMMUNITY
Start: 2024-04-15

## 2024-06-14 NOTE — PATIENT INSTRUCTIONS
We are scheduling a series of multiple appointments with the dentist. If you miss one appointment, the entire series will be canceled. We have discussed the importance of attending all appointments with the patient, and they have confirmed their understanding and the importance of showing up for their appointments.

## 2024-06-24 NOTE — PROGRESS NOTES
Periodic Exam    Ruth Keyes presents for periodic exam. Reviewed PMH, no changes.     Completed oral cancer screening, no abnormal findings. Obtained BWs and reviewed findings.   Completed restorative exam and perio spot probing. New restorative needs charted.   Peridoontal exam performed, probings charted.     Anterior teeth mobile as charted.      Composite Filling    Ruth Keyes presents for composite filling. PMH reviewed, no changes.    Discussed with patient need for RCT if pulp exposure occurs or in future if pulp is inflamed. Pt understands and consents.    Applied topical benzocaine, administered 1 carps 4% articaine 1:100k epi via infiltration    Prepped tooth #5 with 245 carbide on high speed. Caries removed with round carbide on slow speed. Placed palodent matrix. Isolation with cotton rolls and dri-angles    Etch with 37% H2PO4, rinse, dry. Applied Adhese with 20 second scrub once, gentle air dry and light cured for 10s. Restored with Tetric bulk kayli shade A2 and light cured.    Refined with finishing burs, polished with enhance point. Verified occlusion and contacts. Pt left satisfied.    NV: Resins

## 2024-10-04 ENCOUNTER — OFFICE VISIT (OUTPATIENT)
Dept: DENTISTRY | Facility: CLINIC | Age: 59
End: 2024-10-04

## 2024-10-04 VITALS — DIASTOLIC BLOOD PRESSURE: 81 MMHG | SYSTOLIC BLOOD PRESSURE: 127 MMHG | HEART RATE: 83 BPM

## 2024-10-04 DIAGNOSIS — K05.6 PERIODONTAL DISEASE: Primary | ICD-10-CM

## 2024-10-04 PROCEDURE — D0220 INTRAORAL - PERIAPICAL FIRST RADIOGRAPHIC IMAGE: HCPCS

## 2024-10-04 PROCEDURE — D0230 INTRAORAL - PERIAPICAL EACH ADDITIONAL RADIOGRAPHIC IMAGE: HCPCS

## 2024-10-04 PROCEDURE — D0140 LIMITED ORAL EVALUATION - PROBLEM FOCUSED: HCPCS

## 2024-10-04 NOTE — DENTAL PROCEDURE DETAILS
"Limited Exam    Ruth Keyes 59 y.o. female presents with self to [facility] for Limited exam  PMH reviewed, no changes, ASA II.     Chief complaint:  \"I am afraid of the dentist and I need deep cleaning\"    Consent:  Discussed that limited exam focuses on problem area, and same day tx is not guaranteed.  Patient explained to if they wish to have anything else evaluated, they need to return to the practice at which they are a patient of record or schedule a comprehensive exam afterwards.  Patient understands and consent was given by self via verbal consent.      Radiographs: Pas of anteriors.    Assessment:  Stage III Grade B Periodontitis    Plan:   Confimed perio charting from 6/2024. Explained to patient she does need 4Quads of SRP. Patient stated she wants to do something about her missing teeth. Explained to her she can do a bridge on the LR and implant bridge on UL. However, first we must get her periodontally healthy. In additon, We will re-eval #29 is a good abutment tooth for a bridge. Patient understood.    Referral(s): None needed.  Rx: None.    Patient dismissed ambulatory and alert.    NV: 4 Quads of SRP  Attending: Dr. Cobian was present in clinic.   "

## 2025-05-28 ENCOUNTER — TELEPHONE (OUTPATIENT)
Dept: DENTISTRY | Facility: CLINIC | Age: 60
End: 2025-05-28

## 2025-05-28 NOTE — TELEPHONE ENCOUNTER
Patient wanted to inform Dental Team that she called to make an appointment for the SRP and that it is in limbo with the insurance since November as per the Dental Office she was referred to (Dr. Tristan Moya). Patient stated she called numerous times but is given the same answer, that they have not heard back from the insurance. Please advise.

## 2025-06-02 NOTE — TELEPHONE ENCOUNTER
Outreach made to Valerio Suarez,  for [facility] via e-mail. Will update as more information arises.

## 2025-06-04 ENCOUNTER — TELEPHONE (OUTPATIENT)
Dept: DENTISTRY | Facility: CLINIC | Age: 60
End: 2025-06-04

## 2025-06-04 NOTE — TELEPHONE ENCOUNTER
LVM to offer an appt for her deep cleanings for tomorrow. Requested that she call us back if the appt date/time works for her.

## 2025-06-23 NOTE — TELEPHONE ENCOUNTER
No new information. Outreach made again via e-mail to Valerio Suarez,  for [facility].

## 2025-06-27 NOTE — TELEPHONE ENCOUNTER
As per [facility]  Soumya, Patient is aware of insurance denial and she is on SFS. And appointment was given for 06-.

## 2025-07-16 ENCOUNTER — OFFICE VISIT (OUTPATIENT)
Dept: DENTISTRY | Facility: CLINIC | Age: 60
End: 2025-07-16
Payer: COMMERCIAL

## 2025-07-16 VITALS — SYSTOLIC BLOOD PRESSURE: 153 MMHG | DIASTOLIC BLOOD PRESSURE: 81 MMHG | HEART RATE: 83 BPM

## 2025-07-16 DIAGNOSIS — K05.6 PERIODONTAL DISEASE: Primary | ICD-10-CM

## 2025-07-16 PROCEDURE — D0140 LIMITED ORAL EVALUATION - PROBLEM FOCUSED: HCPCS | Performed by: STUDENT IN AN ORGANIZED HEALTH CARE EDUCATION/TRAINING PROGRAM

## 2025-07-16 NOTE — PROGRESS NOTES
Periodic Exam    Ruth Keyes presents for limited periodic exam. Reviewed PMH, no changes.     Pt has been trying to get in for SRP at [facility] but hasn't been rescheduled.     Completed oral cancer screening, no abnormal findings.    Completed restorative exam.    New restorative needs charted.     Pt tx planned for SRP    New referral made for SRP.   #29 mobility of 3(will ext here)      Resins will be done after SRP.

## 2025-07-22 ENCOUNTER — TELEPHONE (OUTPATIENT)
Dept: DENTISTRY | Facility: CLINIC | Age: 60
End: 2025-07-22

## 2025-07-22 NOTE — TELEPHONE ENCOUNTER
Called pt to [facility] 8/25 appt (EXTRACTION) with Dr. Cody since Dr. Cody will be out that day.  Offered earlier date, 7/31.    Please confirm that this is okay.  I'm not sure if you wanted to wait for a later date for any particular reason.    Thanks!    [facility]

## 2025-08-04 ENCOUNTER — OFFICE VISIT (OUTPATIENT)
Age: 60
End: 2025-08-04

## 2025-08-04 VITALS — DIASTOLIC BLOOD PRESSURE: 80 MMHG | SYSTOLIC BLOOD PRESSURE: 126 MMHG

## 2025-08-04 DIAGNOSIS — K02.9 TOOTH DECAY: Primary | ICD-10-CM

## 2025-08-04 PROCEDURE — D7140 EXTRACTION, ERUPTED TOOTH OR EXPOSED ROOT (ELEVATION AND/OR FORCEPS REMOVAL): HCPCS | Performed by: STUDENT IN AN ORGANIZED HEALTH CARE EDUCATION/TRAINING PROGRAM

## 2025-08-04 RX ORDER — OLOPATADINE HYDROCHLORIDE 2 MG/ML
1 SOLUTION OPHTHALMIC DAILY
COMMUNITY
Start: 2025-05-05

## 2025-08-09 ENCOUNTER — OFFICE VISIT (OUTPATIENT)
Dept: URGENT CARE | Facility: CLINIC | Age: 60
End: 2025-08-09
Payer: COMMERCIAL

## 2025-08-09 VITALS
TEMPERATURE: 97.1 F | OXYGEN SATURATION: 95 % | SYSTOLIC BLOOD PRESSURE: 143 MMHG | BODY MASS INDEX: 26.66 KG/M2 | DIASTOLIC BLOOD PRESSURE: 84 MMHG | HEIGHT: 69 IN | WEIGHT: 180 LBS | HEART RATE: 88 BPM | RESPIRATION RATE: 18 BRPM

## 2025-08-09 DIAGNOSIS — J22 LOWER RESPIRATORY INFECTION (E.G., BRONCHITIS, PNEUMONIA, PNEUMONITIS, PULMONITIS): Primary | ICD-10-CM

## 2025-08-09 LAB
SARS-COV-2 AG UPPER RESP QL IA: NEGATIVE
VALID CONTROL: NORMAL

## 2025-08-09 PROCEDURE — 99214 OFFICE O/P EST MOD 30 MIN: CPT

## 2025-08-09 PROCEDURE — 87811 SARS-COV-2 COVID19 W/OPTIC: CPT

## 2025-08-09 PROCEDURE — S9088 SERVICES PROVIDED IN URGENT: HCPCS

## 2025-08-09 RX ORDER — METHYLPREDNISOLONE 4 MG/1
TABLET ORAL
Qty: 1 EACH | Refills: 0 | Status: SHIPPED | OUTPATIENT
Start: 2025-08-09

## 2025-08-09 RX ORDER — BENZONATATE 100 MG/1
100 CAPSULE ORAL 3 TIMES DAILY PRN
Qty: 20 CAPSULE | Refills: 0 | Status: SHIPPED | OUTPATIENT
Start: 2025-08-09

## 2025-08-09 RX ORDER — AZITHROMYCIN 250 MG/1
TABLET, FILM COATED ORAL
Qty: 6 TABLET | Refills: 0 | Status: SHIPPED | OUTPATIENT
Start: 2025-08-09 | End: 2025-08-13

## (undated) DEVICE — SPECIMEN CONTAINER STERILE PEEL PACK

## (undated) DEVICE — SYRINGE 10ML LL

## (undated) DEVICE — INSTRUMENT TRACKER 9733533XOM ENT 1PK

## (undated) DEVICE — SUT SILK 2-0 KS 30 IN 623H

## (undated) DEVICE — IV CATH 18 G X 1.16 IN

## (undated) DEVICE — STERILE BETHLEHEM NASAL PACK: Brand: CARDINAL HEALTH

## (undated) DEVICE — ALL PURPOSE SPONGES,NON-WOVEN, 3 PLY: Brand: CURITY

## (undated) DEVICE — WAND COBLATION REFLEX ULTRA 45

## (undated) DEVICE — SPLINT 1524055 DOYLE II AIRWAY SET: Brand: DOYLE II ™

## (undated) DEVICE — DISPOSABLE OR TOWEL: Brand: CARDINAL HEALTH

## (undated) DEVICE — SUT PLAIN 4-0 SC-1 18 IN 1828H

## (undated) DEVICE — NEEDLE 23G X 1 1/2 SAFETY-GLIDE THIN WALL

## (undated) DEVICE — SINGLE PORT MANIFOLD: Brand: NEPTUNE 2

## (undated) DEVICE — GAUZE SPONGES,8 PLY: Brand: CURITY

## (undated) DEVICE — PATIENT TRACKER 9734887XOM NON-INVASIVE

## (undated) DEVICE — IV CATH INTROCAN 18G X 1 1/4 SAFETY

## (undated) DEVICE — TUBING SUCTION 5MM X 12 FT

## (undated) DEVICE — BLADE 1884004HR TRICUT 5PK M4 4MM ROTATE: Brand: TRICUT

## (undated) DEVICE — BLADE 1884006HR RAD40 5PK M4 4MM ROTATE: Brand: RAD

## (undated) DEVICE — GLOVE SRG LF STRL BGL SKNSNS 8 PF

## (undated) DEVICE — SYRINGE 20ML LL

## (undated) DEVICE — MEDI-VAC YANK SUCT HNDL W/TPRD BULBOUS TIP: Brand: CARDINAL HEALTH

## (undated) DEVICE — MAYO STAND COVER: Brand: CONVERTORS

## (undated) DEVICE — NEURO PATTIES 1/2 X 3

## (undated) DEVICE — ANTI-FOG SOLUTION WITH FOAM PAD: Brand: DEVON

## (undated) DEVICE — SCD SEQUENTIAL COMPRESSION COMFORT SLEEVE MEDIUM KNEE LENGTH: Brand: KENDALL SCD

## (undated) DEVICE — 4-PORT MANIFOLD: Brand: NEPTUNE 2

## (undated) DEVICE — SUT CHROMIC 4-0 P-3 18 MM 1654G

## (undated) DEVICE — NEEDLE 18 G X 1 1/2 SAFETY

## (undated) DEVICE — MINI BLADE ROUND TIP SHARP ON ONE SIDE